# Patient Record
Sex: MALE | Race: WHITE | NOT HISPANIC OR LATINO | Employment: OTHER | URBAN - METROPOLITAN AREA
[De-identification: names, ages, dates, MRNs, and addresses within clinical notes are randomized per-mention and may not be internally consistent; named-entity substitution may affect disease eponyms.]

---

## 2023-03-06 ENCOUNTER — NURSING HOME VISIT (OUTPATIENT)
Dept: POST ACUTE CARE | Facility: EXTERNAL LOCATION | Age: 83
End: 2023-03-06
Payer: COMMERCIAL

## 2023-03-06 DIAGNOSIS — I10 ESSENTIAL (PRIMARY) HYPERTENSION: ICD-10-CM

## 2023-03-06 DIAGNOSIS — G20.A1 PARKINSON'S DISEASE (MULTI): ICD-10-CM

## 2023-03-06 DIAGNOSIS — R53.1 WEAKNESS: ICD-10-CM

## 2023-03-06 PROCEDURE — 99308 SBSQ NF CARE LOW MDM 20: CPT | Performed by: INTERNAL MEDICINE

## 2023-03-06 NOTE — LETTER
Subjective  Chief complaint: Jesus Lantigua is a 82 y.o. male who is a long term resident.  Patient presents for follow-up therapy.   HPI:  Patient presents for follow-up therapy.  Patient examined today at bedside.  Patient denies any pain or discomfort.  Patient continues working in therapy.  Therapy report patient is currently working on dynamic balance activities while standing at rollator.  Patient requires standby assistance.  Patient able to tolerate up to 4 minutes.  Staff reports no new issues.  No acute distress.        Review of Systems  All systems reviewed and negative except for what was mentioned in the HPI    Vital signs: 119/70, 98.0, 78, 17    Objective  Physical Exam  Constitutional:       General: He is not in acute distress.  Eyes:      Extraocular Movements: Extraocular movements intact.   Cardiovascular:      Rate and Rhythm: Regular rhythm.   Pulmonary:      Effort: Pulmonary effort is normal.      Breath sounds: Normal breath sounds.   Abdominal:      General: Bowel sounds are normal.      Palpations: Abdomen is soft.   Musculoskeletal:      Cervical back: Neck supple.      Right lower leg: No edema.      Left lower leg: No edema.   Neurological:      Mental Status: He is alert.   Psychiatric:         Mood and Affect: Mood normal.         Behavior: Behavior is cooperative.         Assessment/Plan  Problem List Items Addressed This Visit       Essential (primary) hypertension     Blood pressures controlled.  Continue metoprolol.  Monitor blood pressures.         Parkinson's disease (CMS/[facility])     Tremors controlled.  Continue Sinemet.         Weakness     Gait unsteady.  Continue working in PT OT.          Medications, treatments, and labs reviewed  Continue medications and treatments as listed in [facility]    Scribe Attestation  By signing my name below, Idomitila Scribe   attest that this documentation has been prepared under the direction and in the presence of Domitila Coleman  LPN.    Provider Attestation - Scribe documentation  All medical record entries made by the Scribe were at my direction and personally dictated by me. I have reviewed the chart and agree that the record accurately reflects my personal performance of the history, physical exam, discussion and plan.

## 2023-03-07 PROBLEM — I10 ESSENTIAL (PRIMARY) HYPERTENSION: Status: ACTIVE | Noted: 2023-03-07

## 2023-03-07 PROBLEM — R41.841 COGNITIVE COMMUNICATION DEFICIT: Status: ACTIVE | Noted: 2023-03-07

## 2023-03-07 PROBLEM — N18.1 CHRONIC KIDNEY DISEASE, STAGE 1: Status: ACTIVE | Noted: 2023-03-07

## 2023-03-07 PROBLEM — G20.A1 PARKINSON'S DISEASE (MULTI): Status: ACTIVE | Noted: 2023-03-07

## 2023-03-07 PROBLEM — E11.9 TYPE 2 DIABETES MELLITUS WITHOUT COMPLICATION (MULTI): Status: ACTIVE | Noted: 2023-03-07

## 2023-03-07 PROBLEM — R53.1 WEAKNESS: Status: ACTIVE | Noted: 2023-03-07

## 2023-03-07 PROBLEM — I25.10 ATHEROSCLEROTIC HEART DISEASE OF NATIVE CORONARY ARTERY WITHOUT ANGINA PECTORIS: Status: ACTIVE | Noted: 2023-03-07

## 2023-03-07 NOTE — PROGRESS NOTES
Subjective   Chief complaint: Jesus Lantigua is a 82 y.o. male who is a long term resident.  Patient presents for follow-up therapy.   HPI:  Patient presents for follow-up therapy.  Patient examined today at bedside.  Patient denies any pain or discomfort.  Patient continues working in therapy.  Therapy report patient is currently working on dynamic balance activities while standing at rollator.  Patient requires standby assistance.  Patient able to tolerate up to 4 minutes.  Staff reports no new issues.  No acute distress.        Review of Systems  All systems reviewed and negative except for what was mentioned in the HPI    Vital signs: 119/70, 98.0, 78, 17    Objective   Physical Exam  Constitutional:       General: He is not in acute distress.  Eyes:      Extraocular Movements: Extraocular movements intact.   Cardiovascular:      Rate and Rhythm: Regular rhythm.   Pulmonary:      Effort: Pulmonary effort is normal.      Breath sounds: Normal breath sounds.   Abdominal:      General: Bowel sounds are normal.      Palpations: Abdomen is soft.   Musculoskeletal:      Cervical back: Neck supple.      Right lower leg: No edema.      Left lower leg: No edema.   Neurological:      Mental Status: He is alert.   Psychiatric:         Mood and Affect: Mood normal.         Behavior: Behavior is cooperative.         Assessment/Plan   Problem List Items Addressed This Visit       Essential (primary) hypertension     Blood pressures controlled.  Continue metoprolol.  Monitor blood pressures.         Parkinson's disease (CMS/[facility])     Tremors controlled.  Continue Sinemet.         Weakness     Gait unsteady.  Continue working in PT OT.          Medications, treatments, and labs reviewed  Continue medications and treatments as listed in [facility]    Scribe Attestation  By signing my name below, Idomitila Scribe   attest that this documentation has been prepared under the direction and in the presence of Domitila Coleman  LPN.    Provider Attestation - Scribe documentation  All medical record entries made by the Scribe were at my direction and personally dictated by me. I have reviewed the chart and agree that the record accurately reflects my personal performance of the history, physical exam, discussion and plan.

## 2023-03-17 NOTE — PROGRESS NOTES
Subjective   Chief complaint: Jesus Lantigua is a 82 y.o. male who is a long term resident.  Patient presents for follow-up therapy.   HPI:  Patient presents for follow-up therapy.  Patient examined today at bedside.  Patient denies any pain or discomfort.  Patient continues working in therapy.  Therapy report patient is currently working on dynamic balance activities while standing at rollator.  Patient requires standby assistance.  Patient able to tolerate up to 4 minutes.  Staff reports no new issues.  No acute distress.        Review of Systems  All systems reviewed and negative except for what was mentioned in the HPI    Vital signs: 119/70, 98.0, 78, 17    Objective   Physical Exam  Constitutional:       General: He is not in acute distress.  Eyes:      Extraocular Movements: Extraocular movements intact.   Cardiovascular:      Rate and Rhythm: Regular rhythm.   Pulmonary:      Effort: Pulmonary effort is normal.      Breath sounds: Normal breath sounds.   Abdominal:      General: Bowel sounds are normal.      Palpations: Abdomen is soft.   Musculoskeletal:      Cervical back: Neck supple.      Right lower leg: No edema.      Left lower leg: No edema.   Neurological:      Mental Status: He is alert.   Psychiatric:         Mood and Affect: Mood normal.         Behavior: Behavior is cooperative.         Assessment/Plan   Problem List Items Addressed This Visit          Nervous    Parkinson's disease (CMS/HCC)     Tremors controlled.  Continue Sinemet.            Circulatory    Essential (primary) hypertension     Blood pressures controlled.  Continue metoprolol.  Monitor blood pressures.            Other    Weakness     Gait unsteady.  Continue working in PT OT.          Medications, treatments, and labs reviewed  Continue medications and treatments as listed in [facility]    Scribe Attestation  By signing my name below, leann DEGROOT Scribe   attest that this documentation has been prepared under the direction  and in the presence of Domitila Coleman LPN.    Provider Attestation - Scribe documentation  All medical record entries made by the Scribe were at my direction and personally dictated by me. I have reviewed the chart and agree that the record accurately reflects my personal performance of the history, physical exam, discussion and plan.

## 2023-04-05 ENCOUNTER — NURSING HOME VISIT (OUTPATIENT)
Dept: POST ACUTE CARE | Facility: EXTERNAL LOCATION | Age: 83
End: 2023-04-05
Payer: COMMERCIAL

## 2023-04-05 DIAGNOSIS — I10 ESSENTIAL (PRIMARY) HYPERTENSION: ICD-10-CM

## 2023-04-05 DIAGNOSIS — G20.A1 PARKINSON'S DISEASE (MULTI): ICD-10-CM

## 2023-04-05 DIAGNOSIS — Z79.4 TYPE 2 DIABETES MELLITUS WITHOUT COMPLICATION, WITH LONG-TERM CURRENT USE OF INSULIN (MULTI): ICD-10-CM

## 2023-04-05 DIAGNOSIS — E11.9 TYPE 2 DIABETES MELLITUS WITHOUT COMPLICATION, WITH LONG-TERM CURRENT USE OF INSULIN (MULTI): ICD-10-CM

## 2023-04-05 PROCEDURE — 99309 SBSQ NF CARE MODERATE MDM 30: CPT | Performed by: INTERNAL MEDICINE

## 2023-04-05 NOTE — LETTER
Patient: Jesus Lantigua  : 1940    Encounter Date: 2023    PROGRESS NOTE    Subjective  Chief complaint: Jesus Lantigua is a 82 y.o. male who is a long term care patient being seen and evaluated for monthly general medical care and follow-up.    HPI:    Patient presents for general medical care and f/u.  Patient seen and examined at bedside.  No issues per nursing.  Patient has no acute complaints.   Patient denies vision changes, excessive thirst, sweating, urinary frequency. Patient with Dx of Parkinson.  Denies increased tremor, stiffness, or changes in functional ability.  HTN BP at goal.  Denies chest pain and headache.  No acute distress.       Objective  Vital signs: 124/72, 18, 98%    Physical Exam  Constitutional:       General: He is not in acute distress.  Eyes:      Extraocular Movements: Extraocular movements intact.   Cardiovascular:      Rate and Rhythm: Regular rhythm.   Pulmonary:      Effort: Pulmonary effort is normal.      Breath sounds: Normal breath sounds.   Abdominal:      General: Bowel sounds are normal.      Palpations: Abdomen is soft.   Musculoskeletal:      Cervical back: Neck supple.      Right lower leg: No edema.      Left lower leg: No edema.   Neurological:      Mental Status: He is alert.   Psychiatric:         Mood and Affect: Mood normal.         Behavior: Behavior is cooperative.         Assessment/Plan  Problem List Items Addressed This Visit          Nervous    Parkinson's disease (CMS/[facility])     Tremors controlled.  Continue Sinemet.            Circulatory    Essential (primary) hypertension     Blood pressures controlled.  Continue metoprolol.  Monitor blood pressures.            Endocrine/Metabolic    Type 2 diabetes mellitus without complication (CMS/HCC)     Monitor BS  Continue current meds          Medications, treatments, and labs reviewed  Continue medications and treatments as listed in [facility]    Scribe Attestation  By signing my name below, IJannette  Fiona Luther   attest that this documentation has been prepared under the direction and in the presence of Steven Muir MD.    Provider Attestation - Scribe documentation  All medical record entries made by the Scribe were at my direction and personally dictated by me. I have reviewed the chart and agree that the record accurately reflects my personal performance of the history, physical exam, discussion and plan.      Electronically Signed By: Steven Muir MD   4/7/23 12:52 PM

## 2023-04-07 NOTE — PROGRESS NOTES
PROGRESS NOTE    Subjective   Chief complaint: Jesus Lantigua is a 82 y.o. male who is a long term care patient being seen and evaluated for monthly general medical care and follow-up.    HPI:    Patient presents for general medical care and f/u.  Patient seen and examined at bedside.  No issues per nursing.  Patient has no acute complaints.   Patient denies vision changes, excessive thirst, sweating, urinary frequency. Patient with Dx of Parkinson.  Denies increased tremor, stiffness, or changes in functional ability.  HTN BP at goal.  Denies chest pain and headache.  No acute distress.       Objective   Vital signs: 124/72, 18, 98%    Physical Exam  Constitutional:       General: He is not in acute distress.  Eyes:      Extraocular Movements: Extraocular movements intact.   Cardiovascular:      Rate and Rhythm: Regular rhythm.   Pulmonary:      Effort: Pulmonary effort is normal.      Breath sounds: Normal breath sounds.   Abdominal:      General: Bowel sounds are normal.      Palpations: Abdomen is soft.   Musculoskeletal:      Cervical back: Neck supple.      Right lower leg: No edema.      Left lower leg: No edema.   Neurological:      Mental Status: He is alert.   Psychiatric:         Mood and Affect: Mood normal.         Behavior: Behavior is cooperative.         Assessment/Plan   Problem List Items Addressed This Visit          Nervous    Parkinson's disease (CMS/[facility])     Tremors controlled.  Continue Sinemet.            Circulatory    Essential (primary) hypertension     Blood pressures controlled.  Continue metoprolol.  Monitor blood pressures.            Endocrine/Metabolic    Type 2 diabetes mellitus without complication (CMS/HCC)     Monitor BS  Continue current meds          Medications, treatments, and labs reviewed  Continue medications and treatments as listed in [facility]    Scribe Attestation  By signing my name below, IJannette, Scribe   attest that this documentation has been prepared under the  direction and in the presence of Steven Muir MD.    Provider Attestation - Scribe documentation  All medical record entries made by the Scribe were at my direction and personally dictated by me. I have reviewed the chart and agree that the record accurately reflects my personal performance of the history, physical exam, discussion and plan.

## 2023-04-17 ENCOUNTER — NURSING HOME VISIT (OUTPATIENT)
Dept: POST ACUTE CARE | Facility: EXTERNAL LOCATION | Age: 83
End: 2023-04-17
Payer: COMMERCIAL
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
## 2023-04-17 PROCEDURE — 99308 SBSQ NF CARE LOW MDM 20: CPT | Performed by: INTERNAL MEDICINE

## 2023-04-17 NOTE — LETTER
Patient: Jesus Lantigua  : 1940    Encounter Date: 2023    PROGRESS NOTE    Subjective  Chief complaint: Jesus Lantigua is a 82 y.o. male who is a long term care patient being seen and evaluated for fall    HPI:    nurse reported that patient had an   Unwitnessed fall.  Patient denies pain and injury from the fall.   patient sent out to emergency room for evaluation.  X-rays and scans negative. No other complaints.      Objective  Vital signs: 141/63, 98%    Physical Exam  Constitutional:       General: He is not in acute distress.  Eyes:      Extraocular Movements: Extraocular movements intact.   Cardiovascular:      Rate and Rhythm: Normal rate and regular rhythm.   Pulmonary:      Effort: Pulmonary effort is normal.      Breath sounds: Normal breath sounds.   Abdominal:      General: Bowel sounds are normal.      Palpations: Abdomen is soft.   Musculoskeletal:         General: Normal range of motion.      Cervical back: Normal range of motion and neck supple.      Right lower leg: No edema.      Left lower leg: No edema.   Neurological:      Mental Status: He is alert.   Psychiatric:         Mood and Affect: Mood normal.         Behavior: Behavior is cooperative.         Assessment/Plan  Problem List Items Addressed This Visit          Other    Fall       No apparent injury          Medications, treatments, and labs reviewed  Continue medications and treatments as listed in [facility]    Scribe Attestation  By signing my name below, I, Fiona Wilder   attest that this documentation has been prepared under the direction and in the presence of Steven Muir MD.    Provider Attestation - Scribe documentation  All medical record entries made by the Scribe were at my direction and personally dictated by me. I have reviewed the chart and agree that the record accurately reflects my personal performance of the history, physical exam, discussion and plan.    1. Fall, initial encounter                Electronically Signed By: Steven Muir MD   4/20/23  5:54 PM
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
## 2023-04-20 NOTE — PROGRESS NOTES
PROGRESS NOTE    Subjective   Chief complaint: Jesus Lantigua is a 82 y.o. male who is a long term care patient being seen and evaluated for fall    HPI:    nurse reported that patient had an   Unwitnessed fall.  Patient denies pain and injury from the fall.   patient sent out to emergency room for evaluation.  X-rays and scans negative. No other complaints.      Objective   Vital signs: 141/63, 98%    Physical Exam  Constitutional:       General: He is not in acute distress.  Eyes:      Extraocular Movements: Extraocular movements intact.   Cardiovascular:      Rate and Rhythm: Normal rate and regular rhythm.   Pulmonary:      Effort: Pulmonary effort is normal.      Breath sounds: Normal breath sounds.   Abdominal:      General: Bowel sounds are normal.      Palpations: Abdomen is soft.   Musculoskeletal:         General: Normal range of motion.      Cervical back: Normal range of motion and neck supple.      Right lower leg: No edema.      Left lower leg: No edema.   Neurological:      Mental Status: He is alert.   Psychiatric:         Mood and Affect: Mood normal.         Behavior: Behavior is cooperative.         Assessment/Plan   Problem List Items Addressed This Visit          Other    Fall       No apparent injury          Medications, treatments, and labs reviewed  Continue medications and treatments as listed in [facility]    Scribe Attestation  By signing my name below, IJannette Scribe   attest that this documentation has been prepared under the direction and in the presence of Steven Muir MD.    Provider Attestation - Scribe documentation  All medical record entries made by the Scribe were at my direction and personally dictated by me. I have reviewed the chart and agree that the record accurately reflects my personal performance of the history, physical exam, discussion and plan.    1. Fall, initial encounter

## 2023-05-03 ENCOUNTER — NURSING HOME VISIT (OUTPATIENT)
Dept: POST ACUTE CARE | Facility: EXTERNAL LOCATION | Age: 83
End: 2023-05-03
Payer: COMMERCIAL

## 2023-05-03 DIAGNOSIS — I10 ESSENTIAL (PRIMARY) HYPERTENSION: ICD-10-CM

## 2023-05-03 DIAGNOSIS — G20.A1 PARKINSON'S DISEASE (MULTI): ICD-10-CM

## 2023-05-03 DIAGNOSIS — E11.9 TYPE 2 DIABETES MELLITUS WITHOUT COMPLICATION, WITH LONG-TERM CURRENT USE OF INSULIN (MULTI): ICD-10-CM

## 2023-05-03 DIAGNOSIS — Z79.4 TYPE 2 DIABETES MELLITUS WITHOUT COMPLICATION, WITH LONG-TERM CURRENT USE OF INSULIN (MULTI): ICD-10-CM

## 2023-05-03 PROCEDURE — 99309 SBSQ NF CARE MODERATE MDM 30: CPT | Performed by: INTERNAL MEDICINE

## 2023-05-03 NOTE — LETTER
Patient: Jesus Lantigua  : 1940    Encounter Date: 2023    PROGRESS NOTE    Subjective  Chief complaint: Jesus Lantigua is a 83 y.o. male who is a long term care patient being seen and evaluated for monthly general medical care and follow-up.    HPI:    Patient presents for general medical care and f/u.  Patient seen and examined at bedside.  No issues per nursing.  Patient has no acute complaints.   Patient denies vision changes, excessive thirst, sweating, urinary frequency. Patient with Dx of Parkinson.  Denies increased tremor, stiffness, or changes in functional ability.  HTN BP at goal.  Denies chest pain and headache.  No acute distress.       Objective  Vital signs: 126/70, 98%    Physical Exam  Constitutional:       General: He is not in acute distress.  Eyes:      Extraocular Movements: Extraocular movements intact.   Cardiovascular:      Rate and Rhythm: Regular rhythm.   Pulmonary:      Effort: Pulmonary effort is normal.      Breath sounds: Normal breath sounds.   Abdominal:      General: Bowel sounds are normal.      Palpations: Abdomen is soft.   Musculoskeletal:      Cervical back: Neck supple.      Right lower leg: No edema.      Left lower leg: No edema.   Neurological:      Mental Status: He is alert.   Psychiatric:         Mood and Affect: Mood normal.         Behavior: Behavior is cooperative.         Assessment/Plan  Problem List Items Addressed This Visit          Nervous    Parkinson's disease (CMS/[facility])     Tremors controlled.  Continue Sinemet.            Circulatory    Essential (primary) hypertension     Blood pressures controlled.  Continue metoprolol.  Monitor blood pressures.            Endocrine/Metabolic    Type 2 diabetes mellitus without complication (CMS/HCC)     Monitor BS  Continue current meds        Medications, treatments, and labs reviewed  Continue medications and treatments as listed in [facility]    Scribe Attestation  By signing my name below, I, Jannette Luther,  Scribe   attest that this documentation has been prepared under the direction and in the presence of Steven Muir MD.    Provider Attestation - Scribe documentation  All medical record entries made by the Scribe were at my direction and personally dictated by me. I have reviewed the chart and agree that the record accurately reflects my personal performance of the history, physical exam, discussion and plan.  1. Essential (primary) hypertension        2. Parkinson's disease (CMS/[facility])        3. Type 2 diabetes mellitus without complication, with long-term current use of insulin (CMS/[facility])              Electronically Signed By: Steven Muir MD   5/4/23  5:42 PM

## 2023-05-04 NOTE — PROGRESS NOTES
PROGRESS NOTE    Subjective   Chief complaint: Jesus Lantigua is a 83 y.o. male who is a long term care patient being seen and evaluated for monthly general medical care and follow-up.    HPI:    Patient presents for general medical care and f/u.  Patient seen and examined at bedside.  No issues per nursing.  Patient has no acute complaints.   Patient denies vision changes, excessive thirst, sweating, urinary frequency. Patient with Dx of Parkinson.  Denies increased tremor, stiffness, or changes in functional ability.  HTN BP at goal.  Denies chest pain and headache.  No acute distress.       Objective   Vital signs: 126/70, 98%    Physical Exam  Constitutional:       General: He is not in acute distress.  Eyes:      Extraocular Movements: Extraocular movements intact.   Cardiovascular:      Rate and Rhythm: Regular rhythm.   Pulmonary:      Effort: Pulmonary effort is normal.      Breath sounds: Normal breath sounds.   Abdominal:      General: Bowel sounds are normal.      Palpations: Abdomen is soft.   Musculoskeletal:      Cervical back: Neck supple.      Right lower leg: No edema.      Left lower leg: No edema.   Neurological:      Mental Status: He is alert.   Psychiatric:         Mood and Affect: Mood normal.         Behavior: Behavior is cooperative.         Assessment/Plan   Problem List Items Addressed This Visit          Nervous    Parkinson's disease (CMS/HCC)     Tremors controlled.  Continue Sinemet.            Circulatory    Essential (primary) hypertension     Blood pressures controlled.  Continue metoprolol.  Monitor blood pressures.            Endocrine/Metabolic    Type 2 diabetes mellitus without complication (CMS/HCC)     Monitor BS  Continue current meds        Medications, treatments, and labs reviewed  Continue medications and treatments as listed in [facility]    Scribe Attestation  By signing my name below, IJannette, Scribe   attest that this documentation has been prepared under the  direction and in the presence of Steven Muir MD.    Provider Attestation - Scribe documentation  All medical record entries made by the Scribe were at my direction and personally dictated by me. I have reviewed the chart and agree that the record accurately reflects my personal performance of the history, physical exam, discussion and plan.  1. Essential (primary) hypertension        2. Parkinson's disease (CMS/[facility])        3. Type 2 diabetes mellitus without complication, with long-term current use of insulin (CMS/[facility])

## 2023-05-10 ENCOUNTER — NURSING HOME VISIT (OUTPATIENT)
Dept: POST ACUTE CARE | Facility: EXTERNAL LOCATION | Age: 83
End: 2023-05-10
Payer: COMMERCIAL

## 2023-05-10 DIAGNOSIS — I25.10 ATHEROSCLEROSIS OF NATIVE CORONARY ARTERY OF NATIVE HEART WITHOUT ANGINA PECTORIS: Primary | ICD-10-CM

## 2023-05-10 DIAGNOSIS — R41.841 COGNITIVE COMMUNICATION DEFICIT: ICD-10-CM
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
## 2023-05-10 DIAGNOSIS — Z79.4 TYPE 2 DIABETES MELLITUS WITHOUT COMPLICATION, WITH LONG-TERM CURRENT USE OF INSULIN (MULTI): ICD-10-CM

## 2023-05-10 DIAGNOSIS — G20.A1 PARKINSON'S DISEASE (MULTI): ICD-10-CM

## 2023-05-10 DIAGNOSIS — N18.1 CHRONIC KIDNEY DISEASE, STAGE 1: ICD-10-CM

## 2023-05-10 DIAGNOSIS — E11.9 TYPE 2 DIABETES MELLITUS WITHOUT COMPLICATION, WITH LONG-TERM CURRENT USE OF INSULIN (MULTI): ICD-10-CM

## 2023-05-10 DIAGNOSIS — R53.1 WEAKNESS: ICD-10-CM

## 2023-05-10 DIAGNOSIS — I10 ESSENTIAL (PRIMARY) HYPERTENSION: ICD-10-CM

## 2023-05-10 PROCEDURE — 99305 1ST NF CARE MODERATE MDM 35: CPT | Performed by: INTERNAL MEDICINE

## 2023-05-10 NOTE — LETTER
Patient: Jesus Lantigua  : 1940    Encounter Date: 05/10/2023    HISTORY & PHYSICAL    Subjective  Chief complaint: Jesus Lantigua is a 83 y.o. male who is a acute skilled care patient being seen and evaluated for multiple medical problems.  Patient presents for weakness    HPI:  lu Lantigua is a 83 year old male who presents   1. Parkinsonism, unspecified Parkinsonism type (HCC)  Stable with symptoms improved on Sinemet. Currently doing well per daughter since becoming more active.  - Continue carbidopa-levodopa (Sinemet)  MG tablet TID   - Encouraged active lifestyle  2. Seizure disorder (HCC)  Stable for many years, no indication to change treatment regimen.  - Continue phenobarbital at current dose   Patient admitted to the nursing home for physical therapy PT and OT before discharge home.        Past Medical History:   Diagnosis Date   • Atherosclerotic heart disease of native coronary artery without angina pectoris    • Chronic kidney disease, stage 1    • Cognitive communication deficit    • Essential (primary) hypertension    • Parkinson's disease (CMS/HCC)    • Type 2 diabetes mellitus without complication (CMS/HCC)        Past Surgical History:   Procedure Laterality Date   • APPENDECTOMY  2016    Appendectomy   • OTHER SURGICAL HISTORY  2016    Cath Stent Placement Number Of Stents Placed:       No family history on file.    Social History     Socioeconomic History   • Marital status:      Spouse name: Not on file   • Number of children: Not on file   • Years of education: Not on file   • Highest education level: Not on file   Occupational History   • Not on file   Tobacco Use   • Smoking status: Not on file   • Smokeless tobacco: Not on file   Substance and Sexual Activity   • Alcohol use: Not on file   • Drug use: Not on file   • Sexual activity: Not on file   Other Topics Concern   • Not on file   Social History Narrative   • Not on file     Social Determinants of  Health     Financial Resource Strain: Not on file   Food Insecurity: Not on file   Transportation Needs: Not on file   Physical Activity: Not on file   Stress: Not on file   Social Connections: Not on file   Intimate Partner Violence: Not on file   Housing Stability: Not on file       Vital signs:       Objective  Physical Exam  Vitals reviewed.   Constitutional:       Appearance: Normal appearance.   HENT:      Head: Normocephalic and atraumatic.   Cardiovascular:      Rate and Rhythm: Normal rate and regular rhythm.   Pulmonary:      Effort: Pulmonary effort is normal.      Breath sounds: Normal breath sounds.   Abdominal:      General: Bowel sounds are normal.      Palpations: Abdomen is soft.   Musculoskeletal:      Cervical back: Neck supple.   Skin:     General: Skin is warm and dry.   Neurological:      General: No focal deficit present.      Mental Status: He is alert.   Psychiatric:         Mood and Affect: Mood normal.         Behavior: Behavior is cooperative.         Assessment/Plan  Problem List Items Addressed This Visit          Cardiac and Vasculature    Atherosclerotic heart disease of native coronary artery without angina pectoris - Primary    Essential (primary) hypertension       Endocrine/Metabolic    Type 2 diabetes mellitus without complication (CMS/HCC)       Genitourinary and Reproductive    Chronic kidney disease, stage 1       Musculoskeletal and Injuries    Fall       Neuro    Cognitive communication deficit    Parkinson's disease (CMS/HCC)       Symptoms and Signs    Weakness     Hospital records reviewed  Medications, treatments, and labs reviewed  Continue medications and treatments as listed in EMR  Discussed with nursing and therapy    Steven Muir MD      Electronically Signed By: Steven Muir MD   6/28/23  5:02 PM

## 2023-06-28 NOTE — PROGRESS NOTES
HISTORY & PHYSICAL    Subjective   Chief complaint: Jesus Lantigua is a 83 y.o. male who is a acute skilled care patient being seen and evaluated for multiple medical problems.  Patient presents for weakness    HPI:  lu Lantigua is a 83 year old male who presents   1. Parkinsonism, unspecified Parkinsonism type (HCC)  Stable with symptoms improved on Sinemet. Currently doing well per daughter since becoming more active.  - Continue carbidopa-levodopa (Sinemet)  MG tablet TID   - Encouraged active lifestyle  2. Seizure disorder (HCC)  Stable for many years, no indication to change treatment regimen.  - Continue phenobarbital at current dose   Patient admitted to the nursing home for physical therapy PT and OT before discharge home.        Past Medical History:   Diagnosis Date    Atherosclerotic heart disease of native coronary artery without angina pectoris     Chronic kidney disease, stage 1     Cognitive communication deficit     Essential (primary) hypertension     Parkinson's disease (CMS/HCC)     Type 2 diabetes mellitus without complication (CMS/HCC)        Past Surgical History:   Procedure Laterality Date    APPENDECTOMY  11/14/2016    Appendectomy    OTHER SURGICAL HISTORY  11/14/2016    Cath Stent Placement Number Of Stents Placed:       No family history on file.    Social History     Socioeconomic History    Marital status:      Spouse name: Not on file    Number of children: Not on file    Years of education: Not on file    Highest education level: Not on file   Occupational History    Not on file   Tobacco Use    Smoking status: Not on file    Smokeless tobacco: Not on file   Substance and Sexual Activity    Alcohol use: Not on file    Drug use: Not on file    Sexual activity: Not on file   Other Topics Concern    Not on file   Social History Narrative    Not on file     Social Determinants of Health     Financial Resource Strain: Not on file   Food Insecurity: Not on file    Transportation Needs: Not on file   Physical Activity: Not on file   Stress: Not on file   Social Connections: Not on file   Intimate Partner Violence: Not on file   Housing Stability: Not on file       Vital signs:       Objective   Physical Exam  Vitals reviewed.   Constitutional:       Appearance: Normal appearance.   HENT:      Head: Normocephalic and atraumatic.   Cardiovascular:      Rate and Rhythm: Normal rate and regular rhythm.   Pulmonary:      Effort: Pulmonary effort is normal.      Breath sounds: Normal breath sounds.   Abdominal:      General: Bowel sounds are normal.      Palpations: Abdomen is soft.   Musculoskeletal:      Cervical back: Neck supple.   Skin:     General: Skin is warm and dry.   Neurological:      General: No focal deficit present.      Mental Status: He is alert.   Psychiatric:         Mood and Affect: Mood normal.         Behavior: Behavior is cooperative.         Assessment/Plan   Problem List Items Addressed This Visit          Cardiac and Vasculature    Atherosclerotic heart disease of native coronary artery without angina pectoris - Primary    Essential (primary) hypertension       Endocrine/Metabolic    Type 2 diabetes mellitus without complication (CMS/HCC)       Genitourinary and Reproductive    Chronic kidney disease, stage 1       Musculoskeletal and Injuries    Fall       Neuro    Cognitive communication deficit    Parkinson's disease (CMS/HCC)       Symptoms and Signs    Weakness     Hospital records reviewed  Medications, treatments, and labs reviewed  Continue medications and treatments as listed in EMR  Discussed with nursing and therapy    Steven Muir MD

## 2024-04-01 ENCOUNTER — NURSING HOME VISIT (OUTPATIENT)
Dept: POST ACUTE CARE | Facility: EXTERNAL LOCATION | Age: 84
End: 2024-04-01
Payer: COMMERCIAL

## 2024-04-01 DIAGNOSIS — Z79.4 TYPE 2 DIABETES MELLITUS WITHOUT COMPLICATION, WITH LONG-TERM CURRENT USE OF INSULIN (MULTI): ICD-10-CM

## 2024-04-01 DIAGNOSIS — E11.9 TYPE 2 DIABETES MELLITUS WITHOUT COMPLICATION, WITH LONG-TERM CURRENT USE OF INSULIN (MULTI): ICD-10-CM

## 2024-04-01 DIAGNOSIS — G20.A1 PARKINSON'S DISEASE, UNSPECIFIED WHETHER DYSKINESIA PRESENT, UNSPECIFIED WHETHER MANIFESTATIONS FLUCTUATE (MULTI): ICD-10-CM

## 2024-04-01 DIAGNOSIS — N39.0 URINARY TRACT INFECTION WITHOUT HEMATURIA, SITE UNSPECIFIED: Primary | ICD-10-CM

## 2024-04-01 DIAGNOSIS — I10 ESSENTIAL (PRIMARY) HYPERTENSION: ICD-10-CM

## 2024-04-01 DIAGNOSIS — R31.9 HEMATURIA, UNSPECIFIED TYPE: ICD-10-CM

## 2024-04-01 DIAGNOSIS — G40.909 SEIZURE DISORDER (MULTI): ICD-10-CM

## 2024-04-01 PROCEDURE — 99305 1ST NF CARE MODERATE MDM 35: CPT | Performed by: INTERNAL MEDICINE

## 2024-04-01 NOTE — LETTER
Patient: Jesus Lantigua  : 1940    Encounter Date: 2024    HISTORY & PHYSICAL    Subjective  Chief complaint: Jesus Lantigua is a 83 y.o. male who is being seen and evaluated for multiple medical problems.  Patient admitted to SNF for therapy due to weakness after recent hospitalization.    HPI:  HPI  Patient was admitted to the hospital for UTI.  Patient was followed by urology.  Patient underwent cystoscopy, bilateral retrograde pyelogram on 3/25/2024.  Patient is to follow-up outpatient with urology and is to continue with Ashley catheter.  Patient had a concern for acute convulsion in hospital and was followed by neuro, patient does have a history of Parkinson's disease.  Neurology recommended continued medications as currently ordered.  Patient was hemodynamically stable to discharge to SNF for continued medical management and therapy.  Patient was seen and examined at bedside, appears to be in no acute distress.  Afebrile.  Patient is tolerating antibiotic for UTI without adverse effects.  Past Medical History:   Diagnosis Date   • Atherosclerotic heart disease of native coronary artery without angina pectoris    • Chronic kidney disease, stage 1    • Cognitive communication deficit    • Dementia (CMS/[facility])    • Essential (primary) hypertension    • Hyperlipidemia    • Parkinson's disease (CMS/[facility])    • Type 2 diabetes mellitus without complication (CMS/[facility])    • Urinary tract infection        Past Surgical History:   Procedure Laterality Date   • APPENDECTOMY  2016    Appendectomy   • OTHER SURGICAL HISTORY  2016    Cath Stent Placement Number Of Stents Placed:       Family History   Problem Relation Name Age of Onset   • No Known Problems Mother     • No Known Problems Father         Social History     Socioeconomic History   • Marital status:      Spouse name: Not on file   • Number of children: Not on file   • Years of education: Not on file   • Highest education level: Not  on file   Occupational History   • Not on file   Tobacco Use   • Smoking status: Not on file   • Smokeless tobacco: Not on file   Substance and Sexual Activity   • Alcohol use: Not on file   • Drug use: Not on file   • Sexual activity: Not on file   Other Topics Concern   • Not on file   Social History Narrative   • Not on file     Social Determinants of Health     Financial Resource Strain: Not on file   Food Insecurity: Not on file   Transportation Needs: Not on file   Physical Activity: Not on file   Stress: Not on file   Social Connections: Not on file   Intimate Partner Violence: Not on file   Housing Stability: Not on file       Vital signs: 120/63, 98.1, 9119 blood sugar 298, 96%    Objective  Physical Exam  Vitals reviewed.   Constitutional:       Appearance: Normal appearance.   HENT:      Head: Normocephalic and atraumatic.   Cardiovascular:      Rate and Rhythm: Normal rate and regular rhythm.   Pulmonary:      Effort: Pulmonary effort is normal.      Breath sounds: Normal breath sounds.   Abdominal:      General: Bowel sounds are normal.      Palpations: Abdomen is soft.   Musculoskeletal:      Cervical back: Neck supple.   Skin:     General: Skin is warm and dry.   Neurological:      General: No focal deficit present.      Mental Status: He is alert.   Psychiatric:         Mood and Affect: Mood normal.         Behavior: Behavior is cooperative.         Assessment/Plan  Problem List Items Addressed This Visit       Essential (primary) hypertension     Monitor blood pressure  Metoprolol         Parkinson's disease (CMS/[facility])     Carbidopa levodopa  Monitor         Type 2 diabetes mellitus without complication (CMS/[facility])     Glucoscans with sliding scale insulin  Insulin  Consistent carb/liberal diabetic diet         UTI (urinary tract infection) - Primary     Continue cephalexin until complete  Follow-up with urology outpatient         Seizure disorder (CMS/[facility])     Phenobarbital  Monitor for seizure  activity         Hematuria     Follow-up with urology outpatient  Ashley  Status post cystoscopy, bilateral retrograde pyelogram          Hospital records reviewed  Medications, treatments, and labs reviewed  Continue medications and treatments as listed in EMR  Discussed with nursing and therapy      Scribe Attestation  Rosaline DEGROOT Scribe   attest that this documentation has been prepared under the direction and in the presence of Steven Muir MD    Provider Attestation - Scribe documentation  All medical record entries made by the Scribe were at my direction and personally dictated by me. I have reviewed the chart and agree that the record accurately reflects my personal performance of the history, physical exam, discussion and plan.   Steven Muir MD      Electronically Signed By: Steven Muir MD   4/1/24  3:07 PM

## 2024-04-02 ENCOUNTER — NURSING HOME VISIT (OUTPATIENT)
Dept: POST ACUTE CARE | Facility: EXTERNAL LOCATION | Age: 84
End: 2024-04-02
Payer: COMMERCIAL

## 2024-04-02 DIAGNOSIS — G20.A1 PARKINSON'S DISEASE, UNSPECIFIED WHETHER DYSKINESIA PRESENT, UNSPECIFIED WHETHER MANIFESTATIONS FLUCTUATE (MULTI): ICD-10-CM

## 2024-04-02 DIAGNOSIS — G40.909 SEIZURE DISORDER (MULTI): ICD-10-CM

## 2024-04-02 DIAGNOSIS — E11.9 TYPE 2 DIABETES MELLITUS WITHOUT COMPLICATION, WITH LONG-TERM CURRENT USE OF INSULIN (MULTI): ICD-10-CM

## 2024-04-02 DIAGNOSIS — R31.9 HEMATURIA, UNSPECIFIED TYPE: ICD-10-CM

## 2024-04-02 DIAGNOSIS — R53.1 WEAKNESS: Primary | ICD-10-CM

## 2024-04-02 DIAGNOSIS — Z79.4 TYPE 2 DIABETES MELLITUS WITHOUT COMPLICATION, WITH LONG-TERM CURRENT USE OF INSULIN (MULTI): ICD-10-CM

## 2024-04-02 DIAGNOSIS — N39.0 URINARY TRACT INFECTION WITHOUT HEMATURIA, SITE UNSPECIFIED: ICD-10-CM

## 2024-04-02 PROCEDURE — 99309 SBSQ NF CARE MODERATE MDM 30: CPT | Performed by: NURSE PRACTITIONER

## 2024-04-02 NOTE — LETTER
Patient: Jesus Lantigua  : 1940    Encounter Date: 2024    PROGRESS NOTE    Subjective  Chief complaint: Jesus Lantigua is a 83 y.o. male who is an acute skilled patient being seen and evaluated for weakness    HPI: is currently working with the therapist. Denies any chest pain or tightness.   Requires brina catheter.   HPI      Objective  Vital signs: 120/64-72-18-97.3     Physical Exam  Vitals and nursing note reviewed.   Constitutional:       General: He is not in acute distress.  Eyes:      Extraocular Movements: Extraocular movements intact.   Cardiovascular:      Rate and Rhythm: Normal rate and regular rhythm.   Pulmonary:      Effort: Pulmonary effort is normal.      Breath sounds: Normal breath sounds.      Comments: Lungs clear   Abdominal:      General: Bowel sounds are normal.      Palpations: Abdomen is soft.   Genitourinary:     Comments: Requires brian catheter   Musculoskeletal:      Cervical back: Neck supple.      Right lower leg: No edema.      Left lower leg: No edema.   Neurological:      Mental Status: He is alert.   Psychiatric:         Mood and Affect: Mood normal.         Behavior: Behavior is cooperative.         Assessment/Plan  Problem List Items Addressed This Visit       Parkinson's disease (CMS/[facility])     Carbidopa levodopa  Monitor         Type 2 diabetes mellitus without complication (CMS/[facility])     Glucoscans with sliding scale insulin  Insulin  Consistent carb/liberal diabetic diet         Weakness - Primary     Continue working in therapy towards goals         UTI (urinary tract infection)     Continue cephalexin until complete  Follow-up with urology outpatient  Requires brian catheter          Seizure disorder (CMS/[facility])     Phenobarbital  Monitor for seizure activity         Hematuria     Follow-up with urology outpatient  Brian  Status post cystoscopy, bilateral retrograde pyelogram          Medications, treatments, and labs reviewed  Continue medications and  treatments as listed in EMR    JACKIE Jones      Electronically Signed By: JACKIE Jones   4/4/24  6:16 PM   No

## 2024-04-03 ENCOUNTER — NURSING HOME VISIT (OUTPATIENT)
Dept: POST ACUTE CARE | Facility: EXTERNAL LOCATION | Age: 84
End: 2024-04-03
Payer: COMMERCIAL

## 2024-04-03 DIAGNOSIS — G40.909 SEIZURE DISORDER (MULTI): ICD-10-CM

## 2024-04-03 DIAGNOSIS — E11.9 TYPE 2 DIABETES MELLITUS WITHOUT COMPLICATION, WITH LONG-TERM CURRENT USE OF INSULIN (MULTI): ICD-10-CM

## 2024-04-03 DIAGNOSIS — Z79.4 TYPE 2 DIABETES MELLITUS WITHOUT COMPLICATION, WITH LONG-TERM CURRENT USE OF INSULIN (MULTI): ICD-10-CM

## 2024-04-03 DIAGNOSIS — I10 ESSENTIAL (PRIMARY) HYPERTENSION: ICD-10-CM

## 2024-04-03 DIAGNOSIS — G20.A1 PARKINSON'S DISEASE, UNSPECIFIED WHETHER DYSKINESIA PRESENT, UNSPECIFIED WHETHER MANIFESTATIONS FLUCTUATE (MULTI): ICD-10-CM

## 2024-04-03 DIAGNOSIS — R53.1 WEAKNESS: Primary | ICD-10-CM

## 2024-04-03 PROCEDURE — 99308 SBSQ NF CARE LOW MDM 20: CPT | Performed by: INTERNAL MEDICINE

## 2024-04-03 NOTE — LETTER
Patient: Jesus Lantigua  : 1940    Encounter Date: 2024    PROGRESS NOTE    Subjective  Chief complaint: Jesus Lantigua is a 83 y.o. male who is an acute skilled patient being seen and evaluated for weakness    HPI:  HPI  Therapy has evaluated patient and establish plan of care and goals.  Patient was seen and examined at bedside, appears to be in no acute distress.  Denies chest pain or shortness of breath.  Afebrile.  Nursing staff voicing no new concerns at this time.    Objective  Vital signs: 120/63, 98.1, 9119 blood sugar 183, 96%    Physical Exam  Vitals reviewed.   Constitutional:       Appearance: Normal appearance.   HENT:      Head: Normocephalic and atraumatic.   Cardiovascular:      Rate and Rhythm: Normal rate and regular rhythm.   Pulmonary:      Effort: Pulmonary effort is normal.      Breath sounds: Normal breath sounds.   Abdominal:      General: Bowel sounds are normal.      Palpations: Abdomen is soft.   Musculoskeletal:      Cervical back: Neck supple.   Skin:     General: Skin is warm and dry.   Neurological:      General: No focal deficit present.      Mental Status: He is alert.      Motor: Weakness present.   Psychiatric:         Mood and Affect: Mood normal.         Behavior: Behavior is cooperative.         Assessment/Plan  Problem List Items Addressed This Visit       Essential (primary) hypertension     Monitor blood pressure  Metoprolol         Parkinson's disease (CMS/[facility])     Carbidopa levodopa  Monitor         Type 2 diabetes mellitus without complication (CMS/[facility])     Glucoscans with sliding scale insulin  Insulin  Consistent carb/liberal diabetic diet         Weakness - Primary     Continue working in therapy towards goals         Seizure disorder (CMS/[facility])     Phenobarbital  Monitor for seizure activity          Medications, treatments, and labs reviewed  Continue medications and treatments as listed in EMR      Scribe AttestRosaline Dodd Scribe attest  that this documentation has been prepared under the direction and in the presence of Steven Muir MD    Provider Attestation - Scribe documentation  All medical record entries made by the Scribe were at my direction and personally dictated by me. I have reviewed the chart and agree that the record accurately reflects my personal performance of the history, physical exam, discussion and plan.   Steven Muir MD        Electronically Signed By: Steven Muir MD   4/4/24  2:12 PM

## 2024-04-04 ENCOUNTER — NURSING HOME VISIT (OUTPATIENT)
Dept: POST ACUTE CARE | Facility: EXTERNAL LOCATION | Age: 84
End: 2024-04-04
Payer: COMMERCIAL

## 2024-04-04 DIAGNOSIS — G40.909 SEIZURE DISORDER (MULTI): ICD-10-CM

## 2024-04-04 DIAGNOSIS — G20.A1 PARKINSON'S DISEASE, UNSPECIFIED WHETHER DYSKINESIA PRESENT, UNSPECIFIED WHETHER MANIFESTATIONS FLUCTUATE (MULTI): ICD-10-CM

## 2024-04-04 DIAGNOSIS — Z79.4 TYPE 2 DIABETES MELLITUS WITHOUT COMPLICATION, WITH LONG-TERM CURRENT USE OF INSULIN (MULTI): ICD-10-CM

## 2024-04-04 DIAGNOSIS — R53.1 WEAKNESS: Primary | ICD-10-CM

## 2024-04-04 DIAGNOSIS — E11.9 TYPE 2 DIABETES MELLITUS WITHOUT COMPLICATION, WITH LONG-TERM CURRENT USE OF INSULIN (MULTI): ICD-10-CM

## 2024-04-04 DIAGNOSIS — I10 ESSENTIAL (PRIMARY) HYPERTENSION: ICD-10-CM

## 2024-04-04 PROCEDURE — 99309 SBSQ NF CARE MODERATE MDM 30: CPT | Performed by: NURSE PRACTITIONER

## 2024-04-04 NOTE — PROGRESS NOTES
PROGRESS NOTE    Subjective   Chief complaint: Jesus Lantigua is a 83 y.o. male who is an acute skilled patient being seen and evaluated for weakness    HPI: Just finished breakfast, reports that he is waiting for therapy.   Denies any chest pain or tightness.   Reports that he slept well last evening.   HPI      Objective   Vital signs: 125/73-72-18-97.3     Physical Exam  Vitals and nursing note reviewed.   Constitutional:       General: He is not in acute distress.  Eyes:      Extraocular Movements: Extraocular movements intact.   Cardiovascular:      Rate and Rhythm: Normal rate and regular rhythm.   Pulmonary:      Effort: Pulmonary effort is normal.      Breath sounds: Normal breath sounds.      Comments: Lungs clear   Abdominal:      General: Bowel sounds are normal.      Palpations: Abdomen is soft.   Musculoskeletal:      Cervical back: Neck supple.      Right lower leg: No edema.      Left lower leg: No edema.   Neurological:      Mental Status: He is alert.   Psychiatric:         Mood and Affect: Mood normal.         Behavior: Behavior is cooperative.         Assessment/Plan   Problem List Items Addressed This Visit       Essential (primary) hypertension     Monitor blood pressure  Metoprolol         Parkinson's disease (CMS/[facility])     Carbidopa levodopa  Monitor         Type 2 diabetes mellitus without complication (CMS/[facility])     Glucoscans with sliding scale insulin  Insulin  Consistent carb/liberal diabetic diet  No evidence of any hypogylcemia          Weakness - Primary     Continue working in therapy towards goals         Seizure disorder (CMS/[facility])     Phenobarbital  Monitor for seizure activity          Medications, treatments, and labs reviewed  Continue medications and treatments as listed in EMR    Rola Enamorado, APRN-CNP

## 2024-04-04 NOTE — PROGRESS NOTES
PROGRESS NOTE    Subjective   Chief complaint: Jesus Lantigua is a 83 y.o. male who is an acute skilled patient being seen and evaluated for weakness    HPI: is currently working with the therapist. Denies any chest pain or tightness.   Requires brian catheter.   HPI      Objective   Vital signs: 120/64-72-18-97.3     Physical Exam  Vitals and nursing note reviewed.   Constitutional:       General: He is not in acute distress.  Eyes:      Extraocular Movements: Extraocular movements intact.   Cardiovascular:      Rate and Rhythm: Normal rate and regular rhythm.   Pulmonary:      Effort: Pulmonary effort is normal.      Breath sounds: Normal breath sounds.      Comments: Lungs clear   Abdominal:      General: Bowel sounds are normal.      Palpations: Abdomen is soft.   Genitourinary:     Comments: Requires brian catheter   Musculoskeletal:      Cervical back: Neck supple.      Right lower leg: No edema.      Left lower leg: No edema.   Neurological:      Mental Status: He is alert.   Psychiatric:         Mood and Affect: Mood normal.         Behavior: Behavior is cooperative.         Assessment/Plan   Problem List Items Addressed This Visit       Parkinson's disease (CMS/[facility])     Carbidopa levodopa  Monitor         Type 2 diabetes mellitus without complication (CMS/[facility])     Glucoscans with sliding scale insulin  Insulin  Consistent carb/liberal diabetic diet         Weakness - Primary     Continue working in therapy towards goals         UTI (urinary tract infection)     Continue cephalexin until complete  Follow-up with urology outpatient  Requires brian catheter          Seizure disorder (CMS/[facility])     Phenobarbital  Monitor for seizure activity         Hematuria     Follow-up with urology outpatient  Brian  Status post cystoscopy, bilateral retrograde pyelogram          Medications, treatments, and labs reviewed  Continue medications and treatments as listed in EMR    Rola Enamorado, APRN-CNP

## 2024-04-04 NOTE — ASSESSMENT & PLAN NOTE
Glucoscans with sliding scale insulin  Insulin  Consistent carb/liberal diabetic diet  No evidence of any hypogylcemia

## 2024-04-04 NOTE — PROGRESS NOTES
PROGRESS NOTE    Subjective   Chief complaint: Jesus Lantigua is a 83 y.o. male who is an acute skilled patient being seen and evaluated for weakness    HPI:  HPI  Therapy has evaluated patient and establish plan of care and goals.  Patient was seen and examined at bedside, appears to be in no acute distress.  Denies chest pain or shortness of breath.  Afebrile.  Nursing staff voicing no new concerns at this time.    Objective   Vital signs: 120/63, 98.1, 9119 blood sugar 183, 96%    Physical Exam  Vitals reviewed.   Constitutional:       Appearance: Normal appearance.   HENT:      Head: Normocephalic and atraumatic.   Cardiovascular:      Rate and Rhythm: Normal rate and regular rhythm.   Pulmonary:      Effort: Pulmonary effort is normal.      Breath sounds: Normal breath sounds.   Abdominal:      General: Bowel sounds are normal.      Palpations: Abdomen is soft.   Musculoskeletal:      Cervical back: Neck supple.   Skin:     General: Skin is warm and dry.   Neurological:      General: No focal deficit present.      Mental Status: He is alert.      Motor: Weakness present.   Psychiatric:         Mood and Affect: Mood normal.         Behavior: Behavior is cooperative.         Assessment/Plan   Problem List Items Addressed This Visit       Essential (primary) hypertension     Monitor blood pressure  Metoprolol         Parkinson's disease (CMS/HCC)     Carbidopa levodopa  Monitor         Type 2 diabetes mellitus without complication (CMS/[facility])     Glucoscans with sliding scale insulin  Insulin  Consistent carb/liberal diabetic diet         Weakness - Primary     Continue working in therapy towards goals         Seizure disorder (CMS/HCC)     Phenobarbital  Monitor for seizure activity          Medications, treatments, and labs reviewed  Continue medications and treatments as listed in EMR      Scribe Attestation  I, Fiona Mauro   attest that this documentation has been prepared under the direction and in  the presence of Steven Muir MD    Provider Attestation - Scribe documentation  All medical record entries made by the Scribe were at my direction and personally dictated by me. I have reviewed the chart and agree that the record accurately reflects my personal performance of the history, physical exam, discussion and plan.   Steven Muir MD

## 2024-04-08 ENCOUNTER — NURSING HOME VISIT (OUTPATIENT)
Dept: POST ACUTE CARE | Facility: EXTERNAL LOCATION | Age: 84
End: 2024-04-08
Payer: COMMERCIAL

## 2024-04-08 DIAGNOSIS — E11.9 TYPE 2 DIABETES MELLITUS WITHOUT COMPLICATION, WITH LONG-TERM CURRENT USE OF INSULIN (MULTI): ICD-10-CM

## 2024-04-08 DIAGNOSIS — G40.909 SEIZURE DISORDER (MULTI): ICD-10-CM

## 2024-04-08 DIAGNOSIS — G20.A1 PARKINSON'S DISEASE, UNSPECIFIED WHETHER DYSKINESIA PRESENT, UNSPECIFIED WHETHER MANIFESTATIONS FLUCTUATE (MULTI): ICD-10-CM

## 2024-04-08 DIAGNOSIS — I10 ESSENTIAL (PRIMARY) HYPERTENSION: ICD-10-CM

## 2024-04-08 DIAGNOSIS — Z79.4 TYPE 2 DIABETES MELLITUS WITHOUT COMPLICATION, WITH LONG-TERM CURRENT USE OF INSULIN (MULTI): ICD-10-CM

## 2024-04-08 DIAGNOSIS — R53.1 WEAKNESS: ICD-10-CM

## 2024-04-08 PROCEDURE — 99308 SBSQ NF CARE LOW MDM 20: CPT | Performed by: INTERNAL MEDICINE

## 2024-04-08 NOTE — PROGRESS NOTES
PROGRESS NOTE    Subjective   Chief complaint: Jesus Lanitgua is a 83 y.o. male who is an acute skilled patient being seen and evaluated for weakness    HPI:  Patient has been working in therapy to improve strength, endurance, and ADLs.  Patient continues to work toward goals. Pt ambulated with RW with SBA/S for distance of 65ft and 45ft. Pt performed seated dynamic balance activity seated, unsupported with CGA/SBA. Seated dynamic balance- F+. Pt seated, unsupported for >15 min. Pt performed neuro cherri in standing pos. with UE support x2/x1 with CGA with mod VC on LE positioning and sequencing with 75% carryover with 0 LOB at outside of //. Working with OT to address self care management. Pt tolerated up to 3 min intervals of ADL completion.  No new concerns today.  Denies n/v/f/c pain.        Objective   Vital signs: 118/76,80,96%,     Physical Exam  Vitals and nursing note reviewed.   Constitutional:       General: He is not in acute distress.  Eyes:      Extraocular Movements: Extraocular movements intact.   Cardiovascular:      Rate and Rhythm: Normal rate and regular rhythm.   Pulmonary:      Effort: Pulmonary effort is normal.      Breath sounds: Normal breath sounds.      Comments: Lungs clear   Abdominal:      General: Bowel sounds are normal.      Palpations: Abdomen is soft.   Musculoskeletal:      Cervical back: Neck supple.      Right lower leg: No edema.      Left lower leg: No edema.   Neurological:      Mental Status: He is alert.   Psychiatric:         Mood and Affect: Mood normal.         Behavior: Behavior is cooperative.         Assessment/Plan   Problem List Items Addressed This Visit       Essential (primary) hypertension     Monitor blood pressure; currently at goal  Metoprolol         Parkinson's disease (CMS/[facility])     Carbidopa levodopa  Monitor         Type 2 diabetes mellitus without complication (CMS/[facility])     Glucoscans with sliding scale insulin  Insulin  Consistent carb/liberal  diabetic diet  No evidence of any hypogylcemia          Weakness     Continue working in therapy towards goals         Seizure disorder (CMS/[facility])     Phenobarbital  Monitor for seizure activity          Medications, treatments, and labs reviewed  Continue medications and treatments as listed in EMR    Scribe Attestation  I, Fiona Fernandez   attest that this documentation has been prepared under the direction and in the presence of Steven Muir MD.     Provider Attestation - Scribe documentation  All medical record entries made by the Scribe were at my direction and personally dictated by me. I have reviewed the chart and agree that the record accurately reflects my personal performance of the history, physical exam, discussion and plan.   Steven Muir MD

## 2024-04-08 NOTE — LETTER
Patient: Jesus Lantigua  : 1940    Encounter Date: 2024    PROGRESS NOTE    Subjective  Chief complaint: Jesus Lantigua is a 83 y.o. male who is an acute skilled patient being seen and evaluated for weakness    HPI:  Patient has been working in therapy to improve strength, endurance, and ADLs.  Patient continues to work toward goals. Pt ambulated with RW with SBA/S for distance of 65ft and 45ft. Pt performed seated dynamic balance activity seated, unsupported with CGA/SBA. Seated dynamic balance- F+. Pt seated, unsupported for >15 min. Pt performed neuro cherri in standing pos. with UE support x2/x1 with CGA with mod VC on LE positioning and sequencing with 75% carryover with 0 LOB at outside of //. Working with OT to address self care management. Pt tolerated up to 3 min intervals of ADL completion.  No new concerns today.  Denies n/v/f/c pain.        Objective  Vital signs: 118/76,80,96%,     Physical Exam  Vitals and nursing note reviewed.   Constitutional:       General: He is not in acute distress.  Eyes:      Extraocular Movements: Extraocular movements intact.   Cardiovascular:      Rate and Rhythm: Normal rate and regular rhythm.   Pulmonary:      Effort: Pulmonary effort is normal.      Breath sounds: Normal breath sounds.      Comments: Lungs clear   Abdominal:      General: Bowel sounds are normal.      Palpations: Abdomen is soft.   Musculoskeletal:      Cervical back: Neck supple.      Right lower leg: No edema.      Left lower leg: No edema.   Neurological:      Mental Status: He is alert.   Psychiatric:         Mood and Affect: Mood normal.         Behavior: Behavior is cooperative.         Assessment/Plan  Problem List Items Addressed This Visit       Essential (primary) hypertension     Monitor blood pressure; currently at goal  Metoprolol         Parkinson's disease (CMS/[facility])     Carbidopa levodopa  Monitor         Type 2 diabetes mellitus without complication (CMS/[facility])      Glucoscans with sliding scale insulin  Insulin  Consistent carb/liberal diabetic diet  No evidence of any hypogylcemia          Weakness     Continue working in therapy towards goals         Seizure disorder (CMS/[facility])     Phenobarbital  Monitor for seizure activity          Medications, treatments, and labs reviewed  Continue medications and treatments as listed in EMR    Scribe Attestation  Dahlia DEGROOT Scribe   attest that this documentation has been prepared under the direction and in the presence of Steven Muir MD.     Provider Attestation - Scribe documentation  All medical record entries made by the Scribe were at my direction and personally dictated by me. I have reviewed the chart and agree that the record accurately reflects my personal performance of the history, physical exam, discussion and plan.   Steven Muir MD      Electronically Signed By: Steven Muir MD   4/8/24  7:38 PM

## 2024-04-09 ENCOUNTER — NURSING HOME VISIT (OUTPATIENT)
Dept: POST ACUTE CARE | Facility: EXTERNAL LOCATION | Age: 84
End: 2024-04-09
Payer: COMMERCIAL

## 2024-04-09 DIAGNOSIS — R53.1 WEAKNESS: Primary | ICD-10-CM

## 2024-04-09 DIAGNOSIS — I10 ESSENTIAL (PRIMARY) HYPERTENSION: ICD-10-CM

## 2024-04-09 DIAGNOSIS — G20.A1 PARKINSON'S DISEASE, UNSPECIFIED WHETHER DYSKINESIA PRESENT, UNSPECIFIED WHETHER MANIFESTATIONS FLUCTUATE (MULTI): ICD-10-CM

## 2024-04-09 DIAGNOSIS — G40.909 SEIZURE DISORDER (MULTI): ICD-10-CM

## 2024-04-09 PROCEDURE — 99316 NF DSCHRG MGMT 30 MIN+: CPT | Performed by: NURSE PRACTITIONER

## 2024-04-09 NOTE — LETTER
Patient: Jesus Lantigua  : 1940    Encounter Date: 2024      PROGRESS NOTE    Subjective  Chief complaint: Jesus Lantigua is a 83 y.o. male who is an acute skilled patient being seen and evaluated for weakness    HPI: therapist reports that he is making good progress. Is scheduled for discharge to his assisted living apartment this week. Social service to schedule home care referral and therapy. Reviwed with him his current medications.   Stressed safety in his home.   Instructed to follow up with his private physician within 2 weeks of discharge.    HPI      Objective  Vital signs: 126/72-82-18-97.3     Physical Exam  Vitals and nursing note reviewed.   Constitutional:       General: He is not in acute distress.  Eyes:      Extraocular Movements: Extraocular movements intact.   Cardiovascular:      Rate and Rhythm: Normal rate and regular rhythm.   Pulmonary:      Effort: Pulmonary effort is normal.      Breath sounds: Normal breath sounds.      Comments: Lungs clear   Abdominal:      General: Bowel sounds are normal.      Palpations: Abdomen is soft.   Musculoskeletal:      Cervical back: Neck supple.      Right lower leg: No edema.      Left lower leg: No edema.   Neurological:      Mental Status: He is alert.   Psychiatric:         Mood and Affect: Mood normal.         Behavior: Behavior is cooperative.       Admission/ Discharge diagnoses:  Assessment/Plan  Problem List Items Addressed This Visit       Essential (primary) hypertension     Monitor blood pressure; currently at goal  Metoprolol  Lungs clear          Parkinson's disease (CMS/HCC)     Carbidopa levodopa  Monitor  Therapy          Weakness - Primary     Has met goals for therapy. Is scheduled for discharge this week.   To be referred to home care and therapy  Stressed safety at all times.   Instructed to schedule follow up appointment with private physician within 2 weeks following discharge   Reviewed medications          Seizure  disorder (CMS/[facility])     Phenobarbital  Monitor for seizure activity          Discharge prognosis - fair   Medications, treatments, and labs reviewed  Continue medications and treatments as listed in EMR  Visit >45minutes   JACKIE Jones      Electronically Signed By: JACKIE Jones   4/11/24  7:57 PM

## 2024-04-10 ENCOUNTER — NURSING HOME VISIT (OUTPATIENT)
Dept: POST ACUTE CARE | Facility: EXTERNAL LOCATION | Age: 84
End: 2024-04-10

## 2024-04-10 DIAGNOSIS — R53.1 WEAKNESS: Primary | ICD-10-CM

## 2024-04-10 DIAGNOSIS — E11.9 TYPE 2 DIABETES MELLITUS WITHOUT COMPLICATION, UNSPECIFIED WHETHER LONG TERM INSULIN USE (MULTI): ICD-10-CM

## 2024-04-10 DIAGNOSIS — G20.A1 PARKINSON'S DISEASE, UNSPECIFIED WHETHER DYSKINESIA PRESENT, UNSPECIFIED WHETHER MANIFESTATIONS FLUCTUATE (MULTI): ICD-10-CM

## 2024-04-10 DIAGNOSIS — I10 ESSENTIAL (PRIMARY) HYPERTENSION: ICD-10-CM

## 2024-04-10 NOTE — LETTER
Patient: Jesus Lantigua  : 1940    Encounter Date: 04/10/2024    PROGRESS NOTE    Subjective  Chief complaint: Jesus Lantigua is a 83 y.o. male who is a Skilled patient being seen and evaluated for monthly general medical care and follow-up.    HPI:  HPI   patient presents for general medical care and f/u.  Patient seen and examined at bedside.  No issues per nursing.  Patient has no acute complaints.   Patient continues working in therapy due to weakness and debility. He is up walking with walker and CGA. He is getting stronger and is feeling well.  Seizures controlled with no breakthrough seizure activity.  Tolerating antiepileptic with no increased drowsiness or sedation.  Patient with Dx of Parkinson.  Denies increased tremor, stiffness, or changes in functional ability.  HTN BP at goal.  Denies chest pain and headache.  No acute distress.  Objective  Vital signs:   118/76, 98.1, 80, 17, 96%     Physical Exam  Constitutional:       General: He is not in acute distress.  Eyes:      Extraocular Movements: Extraocular movements intact.   Cardiovascular:      Rate and Rhythm: Normal rate and regular rhythm.   Pulmonary:      Effort: Pulmonary effort is normal.      Breath sounds: Normal breath sounds.   Abdominal:      General: Bowel sounds are normal.      Palpations: Abdomen is soft.   Musculoskeletal:      Cervical back: Neck supple.      Right lower leg: No edema.      Left lower leg: No edema.   Neurological:      Mental Status: He is alert.   Psychiatric:         Mood and Affect: Mood normal.         Behavior: Behavior is cooperative.         Assessment/Plan  Problem List Items Addressed This Visit       Essential (primary) hypertension     Monitor blood pressure; currently at goal  Metoprolol         Parkinson's disease (CMS/[facility])     Carbidopa levodopa  Monitor  Therapy          Type 2 diabetes mellitus without complication (CMS/[facility])     Glucoscans with sliding scale insulin  Insulin  Consistent  carb/liberal diabetic diet         Weakness - Primary     Continue working in therapy towards goals            Medications, treatments, and labs reviewed  Continue medications and treatments as listed in PCC      1. Weakness        2. Essential (primary) hypertension        3. Type 2 diabetes mellitus without complication, unspecified whether long term insulin use (CMS/[facility])        4. Parkinson's disease, unspecified whether dyskinesia present, unspecified whether manifestations fluctuate (CMS/[facility])                 Electronically Signed By: Steven Muir MD   4/11/24  4:53 PM

## 2024-04-11 NOTE — PROGRESS NOTES
PROGRESS NOTE    Subjective   Chief complaint: Jesus Lantigua is a 83 y.o. male who is an acute skilled patient being seen and evaluated for weakness    HPI: therapist reports that he is making good progress. Is scheduled for discharge to his assisted living apartment this week. Social service to schedule home care referral and therapy. Reviwed with him his current medications.   Stressed safety in his home.   Instructed to follow up with his private physician within 2 weeks of discharge.    HPI      Objective   Vital signs: 126/72-82-18-97.3     Physical Exam  Vitals and nursing note reviewed.   Constitutional:       General: He is not in acute distress.  Eyes:      Extraocular Movements: Extraocular movements intact.   Cardiovascular:      Rate and Rhythm: Normal rate and regular rhythm.   Pulmonary:      Effort: Pulmonary effort is normal.      Breath sounds: Normal breath sounds.      Comments: Lungs clear   Abdominal:      General: Bowel sounds are normal.      Palpations: Abdomen is soft.   Musculoskeletal:      Cervical back: Neck supple.      Right lower leg: No edema.      Left lower leg: No edema.   Neurological:      Mental Status: He is alert.   Psychiatric:         Mood and Affect: Mood normal.         Behavior: Behavior is cooperative.       Admission/ Discharge diagnoses:  Assessment/Plan   Problem List Items Addressed This Visit       Essential (primary) hypertension     Monitor blood pressure; currently at goal  Metoprolol  Lungs clear          Parkinson's disease (CMS/HCC)     Carbidopa levodopa  Monitor  Therapy          Weakness - Primary     Has met goals for therapy. Is scheduled for discharge this week.   To be referred to home care and therapy  Stressed safety at all times.   Instructed to schedule follow up appointment with private physician within 2 weeks following discharge   Reviewed medications          Seizure disorder (CMS/HCC)     Phenobarbital  Monitor for seizure activity           Discharge prognosis - fair   Medications, treatments, and labs reviewed  Continue medications and treatments as listed in EMR  Visit >45minutes   Rola Enamorado, APRN-CNP

## 2024-04-11 NOTE — ASSESSMENT & PLAN NOTE
Has met goals for therapy. Is scheduled for discharge this week.   To be referred to home care and therapy  Stressed safety at all times.   Instructed to schedule follow up appointment with private physician within 2 weeks following discharge   Reviewed medications

## 2024-04-11 NOTE — PROGRESS NOTES
PROGRESS NOTE    Subjective   Chief complaint: Jesus Lantigua is a 83 y.o. male who is a Skilled patient being seen and evaluated for monthly general medical care and follow-up.    HPI:  HPI   patient presents for general medical care and f/u.  Patient seen and examined at bedside.  No issues per nursing.  Patient has no acute complaints.   Patient continues working in therapy due to weakness and debility. He is up walking with walker and CGA. He is getting stronger and is feeling well.  Seizures controlled with no breakthrough seizure activity.  Tolerating antiepileptic with no increased drowsiness or sedation.  Patient with Dx of Parkinson.  Denies increased tremor, stiffness, or changes in functional ability.  HTN BP at goal.  Denies chest pain and headache.  No acute distress.  Objective   Vital signs:   118/76, 98.1, 80, 17, 96%     Physical Exam  Constitutional:       General: He is not in acute distress.  Eyes:      Extraocular Movements: Extraocular movements intact.   Cardiovascular:      Rate and Rhythm: Normal rate and regular rhythm.   Pulmonary:      Effort: Pulmonary effort is normal.      Breath sounds: Normal breath sounds.   Abdominal:      General: Bowel sounds are normal.      Palpations: Abdomen is soft.   Musculoskeletal:      Cervical back: Neck supple.      Right lower leg: No edema.      Left lower leg: No edema.   Neurological:      Mental Status: He is alert.   Psychiatric:         Mood and Affect: Mood normal.         Behavior: Behavior is cooperative.         Assessment/Plan   Problem List Items Addressed This Visit       Essential (primary) hypertension     Monitor blood pressure; currently at goal  Metoprolol         Parkinson's disease (CMS/HCC)     Carbidopa levodopa  Monitor  Therapy          Type 2 diabetes mellitus without complication (CMS/HCC)     Glucoscans with sliding scale insulin  Insulin  Consistent carb/liberal diabetic diet         Weakness - Primary     Continue  working in therapy towards goals            Medications, treatments, and labs reviewed  Continue medications and treatments as listed in PCC      1. Weakness        2. Essential (primary) hypertension        3. Type 2 diabetes mellitus without complication, unspecified whether long term insulin use (CMS/[facility])        4. Parkinson's disease, unspecified whether dyskinesia present, unspecified whether manifestations fluctuate (CMS/[facility])

## 2024-12-09 ENCOUNTER — NURSING HOME VISIT (OUTPATIENT)
Dept: POST ACUTE CARE | Facility: EXTERNAL LOCATION | Age: 84
End: 2024-12-09
Payer: COMMERCIAL

## 2024-12-09 DIAGNOSIS — N17.9 ACUTE KIDNEY INJURY SUPERIMPOSED ON CKD (CMS-HCC): ICD-10-CM

## 2024-12-09 DIAGNOSIS — I25.10 CORONARY ARTERY DISEASE INVOLVING NATIVE CORONARY ARTERY OF NATIVE HEART WITHOUT ANGINA PECTORIS: ICD-10-CM

## 2024-12-09 DIAGNOSIS — N18.9 ACUTE KIDNEY INJURY SUPERIMPOSED ON CKD (CMS-HCC): ICD-10-CM

## 2024-12-09 DIAGNOSIS — G20.A1 PARKINSON'S DISEASE, UNSPECIFIED WHETHER DYSKINESIA PRESENT, UNSPECIFIED WHETHER MANIFESTATIONS FLUCTUATE: ICD-10-CM

## 2024-12-09 DIAGNOSIS — R53.81 PHYSICAL DECONDITIONING: ICD-10-CM

## 2024-12-09 DIAGNOSIS — I10 ESSENTIAL (PRIMARY) HYPERTENSION: ICD-10-CM

## 2024-12-09 DIAGNOSIS — I72.8: ICD-10-CM

## 2024-12-09 DIAGNOSIS — G40.909 SEIZURE DISORDER (MULTI): ICD-10-CM

## 2024-12-09 DIAGNOSIS — E78.5 HYPERLIPIDEMIA, UNSPECIFIED HYPERLIPIDEMIA TYPE: ICD-10-CM

## 2024-12-09 DIAGNOSIS — E11.9 TYPE 2 DIABETES MELLITUS WITHOUT COMPLICATION, UNSPECIFIED WHETHER LONG TERM INSULIN USE (MULTI): ICD-10-CM

## 2024-12-09 DIAGNOSIS — S22.49XD CLOSED FRACTURE OF MULTIPLE RIBS WITH ROUTINE HEALING, UNSPECIFIED LATERALITY, SUBSEQUENT ENCOUNTER: ICD-10-CM

## 2024-12-09 DIAGNOSIS — R40.20: Primary | ICD-10-CM

## 2024-12-09 PROCEDURE — 99497 ADVNCD CARE PLAN 30 MIN: CPT | Performed by: INTERNAL MEDICINE

## 2024-12-09 PROCEDURE — 99306 1ST NF CARE HIGH MDM 50: CPT | Performed by: INTERNAL MEDICINE

## 2024-12-09 NOTE — PROGRESS NOTES
Nursing Home  History & Physical Visit    Name: Jesus Lantigua  MRN: [id number]  YOB: 1940  Date of Service: 12/9/2024      History of Present Illness  Pt was seen , available records reviewed. Hx mainly from chart and some from patient. Pt admits to forget things at times. He was in hosp with loss of unconsciousness , thought to be due to his hx of seizure disorder. He had neg CT brain for acute stroke . During reexcitation at hosp he had rib fracture . He has hx of parkinson, DM and HTN also  Lives in assisted living for a year or so.     Has some rib cage pain on deep palpation   Patient denies any shortness of breath, PND, orthopnea, , palpitation,  or edema in legs  patient denies any abdominal pain, tenderness, nausea, vomiting, change in bowel habits or blood in stool.          Review of Systems  REVIEW OF SYSTEMS:   All other systems have been reviewed and are negative in relation to patient's complaint and other than what is mentioned in History of present illness.     Vital Signs  Temperature  97.6 °F  12/09/2024 10:05  Pulse  73 per minute  12/09/2024 10:05  Respirations  18 per minute  12/09/2024 10:05  Blood Pressure  102 / 58 mmHg  12/09/2024 10:05  O2 Saturation  97 %  12/09/2024 10:05  Blood Sugar  258 mg/dL  12/09/2024 09:01  Weight  176.4 lbs / Admission BMI: 25.31  12/03/2024 18:00  Physical Exam  Vitals noted , [place]   Not in acute distress  Conj Pink, No icterus  No bruise on chest wall. Some local tenderness on deep palpation   Neck: No Cervical LN enlargement, No Thyroid enlargement   Lungs: good air entry bilaterally, no rales or rhonchi  CVS: S1 S2 + , no S3. No loud heart murmur heard.   Abdomen: Soft, non tender , BS +. no organomegaly , no CVA tenderness  CNS: Pt is alert, moving all 4 extremities. no motor weakness or abnormal movements noted on gross examination.  No spine tenderness  Extremities: No edema, No calf tenderness, Chivo's sign negative.     Assessment/Plan:    1.  Unconsciousness (Multi) ? Due to seizure vs other etiology    2. Seizure disorder (Multi) -hx of.    3. Essential (primary) hypertension -controlled     4. Type 2 diabetes mellitus without complication, unspecified whether long term insulin use (Multi)    5. Parkinson's disease, unspecified whether dyskinesia present, unspecified whether manifestations fluctuate    6. Physical deconditioning -will get therapy    7. Acute kidney injury superimposed on CKD (CMS-HCC) -improved   8. 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 No surgical intervention at present    9. Closed fracture of multiple ribs with routine healing, unspecified laterality, subsequent encounter    10. Hyperlipidemia, unspecified hyperlipidemia type    11. Coronary artery disease involving native coronary artery of native heart witho  ut angina pectoris -stable        Plan:     Available medical records reviewed . Patient was examined and detailed History and physical done . All questions answered to patient's satisfaction . Total time for chart reviewing , detailed examination and coordinating care with patient was > 35 minutes.   During visit today , I asked patient as well as looked in records  in regards to advanced directive , POA etc. His daughter Emely is health POA . . Pt wants to be DNR CCA . Questions related to it answered to pt's satisfaction .    Patient is doing well.   Continue OT/PT Rehab.   Pain control  Current medications are effective. advised to continue current medications.  Will continue to follow   Patient felt satisfied with plan  .

## 2024-12-09 NOTE — LETTER
Patient: Jesus Lantigua  : 1940    Encounter Date: 2024    Nursing Home  History & Physical Visit    Name: Jesus Lantigua  MRN: [id number]  YOB: 1940  Date of Service: 2024      History of Present Illness  Pt was seen , available records reviewed. Hx mainly from chart and some from patient. Pt admits to forget things at times. He was in hosp with loss of unconsciousness , thought to be due to his hx of seizure disorder. He had neg CT brain for acute stroke . During reexcitation at hosp he had rib fracture . He has hx of parkinson, DM and HTN also  Lives in assisted living for a year or so.     Has some rib cage pain on deep palpation   Patient denies any shortness of breath, PND, orthopnea, , palpitation,  or edema in legs  patient denies any abdominal pain, tenderness, nausea, vomiting, change in bowel habits or blood in stool.          Review of Systems  REVIEW OF SYSTEMS:   All other systems have been reviewed and are negative in relation to patient's complaint and other than what is mentioned in History of present illness.     Vital Signs  Temperature  97.6 °F  2024 10:05  Pulse  73 per minute  2024 10:05  Respirations  18 per minute  2024 10:05  Blood Pressure  102 / 58 mmHg  2024 10:05  O2 Saturation  97 %  2024 10:05  Blood Sugar  258 mg/dL  2024 09:01  Weight  176.4 lbs / Admission BMI: 25.31  2024 18:00  Physical Exam  Vitals noted , [place]   Not in acute distress  Conj Pink, No icterus  No bruise on chest wall. Some local tenderness on deep palpation   Neck: No Cervical LN enlargement, No Thyroid enlargement   Lungs: good air entry bilaterally, no rales or rhonchi  CVS: S1 S2 + , no S3. No loud heart murmur heard.   Abdomen: Soft, non tender , BS +. no organomegaly , no CVA tenderness  CNS: Pt is alert, moving all 4 extremities. no motor weakness or abnormal movements noted on gross examination.  No spine tenderness  Extremities: No  edema, No calf tenderness, Chivo's sign negative.     Assessment/Plan:    1. Unconsciousness (Multi) ? Due to seizure vs other etiology    2. Seizure disorder (Multi) -hx of.    3. Essential (primary) hypertension -controlled     4. Type 2 diabetes mellitus without complication, unspecified whether long term insulin use (Multi)    5. Parkinson's disease, unspecified whether dyskinesia present, unspecified whether manifestations fluctuate    6. Physical deconditioning -will get therapy    7. Acute kidney injury superimposed on CKD (CMS-HCC) -improved   8. 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 No surgical intervention at present    9. Closed fracture of multiple ribs with routine healing, unspecified laterality, subsequent encounter    10. Hyperlipidemia, unspecified hyperlipidemia type    11. Coronary artery disease involving native coronary artery of native heart witho  ut angina pectoris -stable        Plan:     Available medical records reviewed . Patient was examined and detailed History and physical done . All questions answered to patient's satisfaction . Total time for chart reviewing , detailed examination and coordinating care with patient was > 35 minutes.   During visit today , I asked patient as well as looked in records  in regards to advanced directive , POA etc. His daughter Emely is health POA . . Pt wants to be DNR CCA . Questions related to it answered to pt's satisfaction .    Patient is doing well.   Continue OT/PT Rehab.   Pain control  Current medications are effective. advised to continue current medications.  Will continue to follow   Patient felt satisfied with plan  .      Electronically Signed By: Earle Lagunas MD   12/9/24 12:28 PM

## 2024-12-12 ENCOUNTER — NURSING HOME VISIT (OUTPATIENT)
Dept: POST ACUTE CARE | Facility: EXTERNAL LOCATION | Age: 84
End: 2024-12-12
Payer: COMMERCIAL

## 2024-12-12 DIAGNOSIS — G20.A1 PARKINSON'S DISEASE, UNSPECIFIED WHETHER DYSKINESIA PRESENT, UNSPECIFIED WHETHER MANIFESTATIONS FLUCTUATE: ICD-10-CM

## 2024-12-12 DIAGNOSIS — R40.20: Primary | ICD-10-CM

## 2024-12-12 DIAGNOSIS — G40.909 SEIZURE DISORDER (MULTI): ICD-10-CM

## 2024-12-12 DIAGNOSIS — E11.9 TYPE 2 DIABETES MELLITUS WITHOUT COMPLICATION, UNSPECIFIED WHETHER LONG TERM INSULIN USE (MULTI): ICD-10-CM

## 2024-12-12 DIAGNOSIS — I10 ESSENTIAL (PRIMARY) HYPERTENSION: ICD-10-CM

## 2024-12-12 DIAGNOSIS — R53.81 PHYSICAL DECONDITIONING: ICD-10-CM

## 2024-12-12 PROCEDURE — 99308 SBSQ NF CARE LOW MDM 20: CPT | Performed by: INTERNAL MEDICINE

## 2024-12-12 NOTE — LETTER
Patient: Jesus Lantigua  : 1940    Encounter Date: 2024        Nursing home follow up visit  Name: Jesus Lantigua  MRN: [id number]  YOB: 1940  Date of Service: 24      Pt was evaluated during nursing home visit today  Patient is doing OK. Participating in therapy well  No sob, cp, PND, orthopnea  Eating ok, sleeping ok   Moving BM ok.   Tolerating medications well    Objective :  Temperature  97.4 °F  2024 11:11  Pulse  77 per minute  2024 11:11  Respirations  16 per minute  2024 11:11  Blood Pressure  110 / 54 mmHg  2024 11:11  O2 Saturation  97 %  2024 11:11  Blood Sugar  171 mg/dL  2024 16:15  Weight  173.2 lbs / Routine BMI: 24.85  12/10/2024 12:12  Vitals were noted  Patient is not any acute distress  Conjunctiva- Pink, no icterus   No cervical LN enlargement   Lungs clear, s1s2 +   No edema , No calf tenderness     Assessment:    1. Unconsciousness (Multi) -which led to hospitalization . Ok here at SNF   2. Seizure disorder (Multi) -hx of.    3. Essential (primary) hypertension -controlled     4. Type 2 diabetes mellitus without complication, unspecified whether long term insulin use (Multi)    5. Parkinson's disease, unspecified whether dyskinesia present, unspecified whether manifestations fluctuate -hx of   6. Physical deconditioning -getting therapy         Plan:  Patient is doing well.   Continue OT/PT Rehab.   I have reviewed all active medications patient is currently on . Questions related to medication answered to patient's satisfaction.  Current medications are effective. advised to continue current medications.  Will continue to follow   Patient felt satisfied with plan            Electronically Signed By: Earle Lagunas MD   24  8:05 PM

## 2024-12-13 NOTE — PROGRESS NOTES
Nursing home follow up visit  Name: Jesus Lantigua  MRN: [id number]  YOB: 1940  Date of Service: 12/11/24      Pt was evaluated during nursing home visit today  Patient is doing OK. Participating in therapy well  No sob, cp, PND, orthopnea  Eating ok, sleeping ok   Moving BM ok.   Tolerating medications well    Objective :  Temperature  97.4 °F  12/12/2024 11:11  Pulse  77 per minute  12/12/2024 11:11  Respirations  16 per minute  12/12/2024 11:11  Blood Pressure  110 / 54 mmHg  12/12/2024 11:11  O2 Saturation  97 %  12/12/2024 11:11  Blood Sugar  171 mg/dL  12/12/2024 16:15  Weight  173.2 lbs / Routine BMI: 24.85  12/10/2024 12:12  Vitals were noted  Patient is not any acute distress  Conjunctiva- Pink, no icterus   No cervical LN enlargement   Lungs clear, s1s2 +   No edema , No calf tenderness     Assessment:    1. Unconsciousness (Multi) -which led to hospitalization . Ok here at SNF   2. Seizure disorder (Multi) -hx of.    3. Essential (primary) hypertension -controlled     4. Type 2 diabetes mellitus without complication, unspecified whether long term insulin use (Multi)    5. Parkinson's disease, unspecified whether dyskinesia present, unspecified whether manifestations fluctuate -hx of   6. Physical deconditioning -getting therapy         Plan:  Patient is doing well.   Continue OT/PT Rehab.   I have reviewed all active medications patient is currently on . Questions related to medication answered to patient's satisfaction.  Current medications are effective. advised to continue current medications.  Will continue to follow   Patient felt satisfied with plan

## 2025-05-15 ENCOUNTER — NURSING HOME VISIT (OUTPATIENT)
Dept: POST ACUTE CARE | Facility: EXTERNAL LOCATION | Age: 85
End: 2025-05-15
Payer: COMMERCIAL

## 2025-05-15 DIAGNOSIS — R41.0 CONFUSION: Primary | ICD-10-CM

## 2025-05-15 DIAGNOSIS — R53.81 PHYSICAL DECONDITIONING: ICD-10-CM

## 2025-05-15 DIAGNOSIS — F02.B0 MODERATE DEMENTIA DUE TO PARKINSON'S DISEASE, WITHOUT BEHAVIORAL DISTURBANCE, PSYCHOTIC DISTURBANCE, MOOD DISTURBANCE, OR ANXIETY (MULTI): ICD-10-CM

## 2025-05-15 DIAGNOSIS — E11.9 TYPE 2 DIABETES MELLITUS WITHOUT COMPLICATION, UNSPECIFIED WHETHER LONG TERM INSULIN USE: ICD-10-CM

## 2025-05-15 DIAGNOSIS — R41.841 COGNITIVE COMMUNICATION DEFICIT: ICD-10-CM

## 2025-05-15 DIAGNOSIS — N18.1 CHRONIC KIDNEY DISEASE, STAGE 1: ICD-10-CM

## 2025-05-15 DIAGNOSIS — I10 ESSENTIAL (PRIMARY) HYPERTENSION: ICD-10-CM

## 2025-05-15 DIAGNOSIS — G20.A1 MODERATE DEMENTIA DUE TO PARKINSON'S DISEASE, WITHOUT BEHAVIORAL DISTURBANCE, PSYCHOTIC DISTURBANCE, MOOD DISTURBANCE, OR ANXIETY (MULTI): ICD-10-CM

## 2025-05-15 DIAGNOSIS — Z91.81 HX OF FALL: ICD-10-CM

## 2025-05-15 DIAGNOSIS — G20.A1 PARKINSON'S DISEASE, UNSPECIFIED WHETHER DYSKINESIA PRESENT, UNSPECIFIED WHETHER MANIFESTATIONS FLUCTUATE: ICD-10-CM

## 2025-05-15 PROCEDURE — 99306 1ST NF CARE HIGH MDM 50: CPT | Performed by: INTERNAL MEDICINE

## 2025-05-15 PROCEDURE — 99497 ADVNCD CARE PLAN 30 MIN: CPT | Performed by: INTERNAL MEDICINE

## 2025-05-15 NOTE — PROGRESS NOTES
Nursing Home  History & Physical Visit    Name: Jesus Lantigua  MRN: [id number]  YOB: 1940  Date of Service: 5/15/2025      History of Present Illness  Chart reviewed. Hx mainly from chart as pt has dementia. He was admitted to [facility] with increase confusion , some difficulty with speech . He has hx of falls in past too. He has hx of parkinson and dementia. CT brain was neg. He was stabilized at [facility] and now here for rehab.     Hx of htn , DM and past surgical hx of surgery on ankle .  He lives in assisted living place in [place] as per him for a yr or so.     Review of Systems  REVIEW OF SYSTEMS:   All other systems have been reviewed and are negative in relation to patient's complaint and other than what is mentioned in History of present illness.     Vital Signs  Temperature  97.4 °F  05/15/2025 02:06  Pulse  81 per minute  05/15/2025 02:06  Respirations  17 per minute  05/15/2025 09:54  Blood Pressure  108 / 55 mmHg  05/15/2025 09:54  O2 Saturation  94 %  05/15/2025 02:06  Blood Sugar  234 mg/dL  05/15/2025 09:47  Weight  174.8 lbs / Admission BMI: 25.08  05/13/2025 22:23  Physical Exam  Vitals noted , stable  Not in acute distress  Conj Pink, No icterus  Neck: No Cervical LN enlargement, No Thyroid enlargement   Lungs: good air entry bilaterally, occ L basilar rales but no  rhonchi  CVS: S1 S2 + , no S3. No loud heart murmur heard.   Abdomen: Soft, non tender , BS +. no organomegaly , no CVA tenderness  CNS: Pt is alert, pleasantly demented, doesnot know what year it is. moving all 4 extremities. no motor weakness or abnormal movements noted on gross examination.  No spine tenderness  Extremities: No edema, No calf tenderness, Chivo's sign negative.     Assessment/Plan:    1. Confusion no new stroke noted   2. Parkinson's disease, unspecified whether dyskinesia present, unspecified whether manifestations fluctuate hx of. On med.    3. Moderate dementia due to Parkinson's disease, without behavioral  disturbance, psychotic disturbance, mood disturbance, or anxiety (Multi) hx of.    4. Essential (primary) hypertension - controlled   5. Type 2 diabetes mellitus without complication, unspecified whether long term insulin use    6. Physical deconditioning -will get therapy    7. Hx of fall    8. Chronic kidney disease, stage 1    9. Cognitive communication deficit -speech seems to be ok currently         Plan:     Available medical records reviewed . Patient was examined and detailed History and physical done . All questions answered to patient's satisfaction . Total time for chart reviewing , detailed examination and coordinating care with patient was > 35 minutes.   During visit today , I asked patient as well as looked in records  in regards to advanced directive , POA etc. Pt wants to be DNR CCA . His daughter is medical POA . Questions related to it answered to pt's satisfaction .  Patient is doing well.   Continue OT/PT Rehab.   Current medications are effective. advised to continue current medications.  Will continue to follow   Patient felt satisfied with plan

## 2025-05-15 NOTE — LETTER
Patient: Jesus Lantigua  : 1940    Encounter Date: 05/15/2025    Nursing Home  History & Physical Visit    Name: Jesus Lantigua  MRN: [id number]  YOB: 1940  Date of Service: 5/15/2025      History of Present Illness  Chart reviewed. Hx mainly from chart as pt has dementia. He was admitted to [facility] with increase confusion , some difficulty with speech . He has hx of falls in past too. He has hx of parkinson and dementia. CT brain was neg. He was stabilized at [facility] and now here for rehab.     Hx of htn , DM and past surgical hx of surgery on ankle .  He lives in assisted living place in [place] as per him for a yr or so.     Review of Systems  REVIEW OF SYSTEMS:   All other systems have been reviewed and are negative in relation to patient's complaint and other than what is mentioned in History of present illness.     Vital Signs  Temperature  97.4 °F  05/15/2025 02:06  Pulse  81 per minute  05/15/2025 02:06  Respirations  17 per minute  05/15/2025 09:54  Blood Pressure  108 / 55 mmHg  05/15/2025 09:54  O2 Saturation  94 %  05/15/2025 02:06  Blood Sugar  234 mg/dL  05/15/2025 09:47  Weight  174.8 lbs / Admission BMI: 25.08  2025 22:23  Physical Exam  Vitals noted , stable  Not in acute distress  Conj Pink, No icterus  Neck: No Cervical LN enlargement, No Thyroid enlargement   Lungs: good air entry bilaterally, occ L basilar rales but no  rhonchi  CVS: S1 S2 + , no S3. No loud heart murmur heard.   Abdomen: Soft, non tender , BS +. no organomegaly , no CVA tenderness  CNS: Pt is alert, pleasantly demented, doesnot know what year it is. moving all 4 extremities. no motor weakness or abnormal movements noted on gross examination.  No spine tenderness  Extremities: No edema, No calf tenderness, Chivo's sign negative.     Assessment/Plan:    1. Confusion no new stroke noted   2. Parkinson's disease, unspecified whether dyskinesia present, unspecified whether manifestations fluctuate hx of. On med.     3. Moderate dementia due to Parkinson's disease, without behavioral disturbance, psychotic disturbance, mood disturbance, or anxiety (Multi) hx of.    4. Essential (primary) hypertension - controlled   5. Type 2 diabetes mellitus without complication, unspecified whether long term insulin use    6. Physical deconditioning -will get therapy    7. Hx of fall    8. Chronic kidney disease, stage 1    9. Cognitive communication deficit -speech seems to be ok currently         Plan:     Available medical records reviewed . Patient was examined and detailed History and physical done . All questions answered to patient's satisfaction . Total time for chart reviewing , detailed examination and coordinating care with patient was > 35 minutes.   During visit today , I asked patient as well as looked in records  in regards to advanced directive , POA etc. Pt wants to be DNR CCA . His daughter is medical POA . Questions related to it answered to pt's satisfaction .  Patient is doing well.   Continue OT/PT Rehab.   Current medications are effective. advised to continue current medications.  Will continue to follow   Patient felt satisfied with plan      Electronically Signed By: Earle Lagunas MD   5/15/25 11:02 AM

## 2025-05-19 ENCOUNTER — NURSING HOME VISIT (OUTPATIENT)
Dept: POST ACUTE CARE | Facility: EXTERNAL LOCATION | Age: 85
End: 2025-05-19
Payer: COMMERCIAL

## 2025-05-19 DIAGNOSIS — G20.A1 MODERATE DEMENTIA DUE TO PARKINSON'S DISEASE, WITHOUT BEHAVIORAL DISTURBANCE, PSYCHOTIC DISTURBANCE, MOOD DISTURBANCE, OR ANXIETY (MULTI): ICD-10-CM

## 2025-05-19 DIAGNOSIS — Z79.4 TYPE 2 DIABETES MELLITUS WITHOUT COMPLICATION, WITH LONG-TERM CURRENT USE OF INSULIN: ICD-10-CM

## 2025-05-19 DIAGNOSIS — I10 ESSENTIAL (PRIMARY) HYPERTENSION: ICD-10-CM

## 2025-05-19 DIAGNOSIS — G40.909 SEIZURE DISORDER (MULTI): ICD-10-CM

## 2025-05-19 DIAGNOSIS — E11.9 TYPE 2 DIABETES MELLITUS WITHOUT COMPLICATION, WITH LONG-TERM CURRENT USE OF INSULIN: ICD-10-CM

## 2025-05-19 DIAGNOSIS — G20.A1 PARKINSON'S DISEASE, UNSPECIFIED WHETHER DYSKINESIA PRESENT, UNSPECIFIED WHETHER MANIFESTATIONS FLUCTUATE: Primary | ICD-10-CM

## 2025-05-19 DIAGNOSIS — F02.B0 MODERATE DEMENTIA DUE TO PARKINSON'S DISEASE, WITHOUT BEHAVIORAL DISTURBANCE, PSYCHOTIC DISTURBANCE, MOOD DISTURBANCE, OR ANXIETY (MULTI): ICD-10-CM

## 2025-05-19 PROCEDURE — 99309 SBSQ NF CARE MODERATE MDM 30: CPT | Performed by: INTERNAL MEDICINE

## 2025-05-19 NOTE — PROGRESS NOTES
Nursing home follow up visit  Name: Jesus Lantigua  MRN: [id number]  YOB: 1940  Date of Service: 5/19/2025      Pt was evaluated during nursing home visit today  Patient is doing OK. Participating in therapy well  No sob, cp, PND, orthopnea  Eating ok, sleeping ok   Moving BM ok.   Tolerating medications well  Pleasantly confused    Objective :  Vitals were noted, stable  Patient is not any acute distress  Conjunctiva- Pink, no icterus   No cervical LN enlargement   Lungs clear, s1s2 +   Alert, not oriented  No edema , No calf tenderness     Assessment:    1. Parkinson's disease, unspecified whether dyskinesia present, unspecified whether manifestations fluctuate hx of. On meds.    2. Moderate dementia due to Parkinson's disease, without behavioral disturbance, psychotic disturbance, mood disturbance, or anxiety (Multi)    3. Essential (primary) hypertension -controlled     4. Seizure disorder (Multi) on meds. Sees neurology   5 uncontrolled DM - extra insulin today .      Plan:  Patient is doing well.   Continue OT/PT Rehab.   I have reviewed all active medications patient is currently on . Questions related to medication answered to patient's satisfaction.  Current medications are effective. advised to continue current medications.  Will continue to follow   Patient felt satisfied with plan    Addm: pt has hx of DM and sugar is > 400 . Will give extra insulin . Discussed with nurse about it. Will monitor sugar .

## 2025-05-19 NOTE — LETTER
Patient: Jesus Lantigua  : 1940    Encounter Date: 2025        Nursing home follow up visit  Name: Jesus Lantigua  MRN: [id number]  YOB: 1940  Date of Service: 2025      Pt was evaluated during nursing home visit today  Patient is doing OK. Participating in therapy well  No sob, cp, PND, orthopnea  Eating ok, sleeping ok   Moving BM ok.   Tolerating medications well  Pleasantly confused    Objective :  Vitals were noted, stable  Patient is not any acute distress  Conjunctiva- Pink, no icterus   No cervical LN enlargement   Lungs clear, s1s2 +   Alert, not oriented  No edema , No calf tenderness     Assessment:    1. Parkinson's disease, unspecified whether dyskinesia present, unspecified whether manifestations fluctuate hx of. On meds.    2. Moderate dementia due to Parkinson's disease, without behavioral disturbance, psychotic disturbance, mood disturbance, or anxiety (Multi)    3. Essential (primary) hypertension -controlled     4. Seizure disorder (Multi) on meds. Sees neurology        Plan:  Patient is doing well.   Continue OT/PT Rehab.   I have reviewed all active medications patient is currently on . Questions related to medication answered to patient's satisfaction.  Current medications are effective. advised to continue current medications.  Will continue to follow   Patient felt satisfied with plan            Electronically Signed By: Earle Lagunas MD   25 11:24 AM

## 2025-06-05 ENCOUNTER — NURSING HOME VISIT (OUTPATIENT)
Dept: POST ACUTE CARE | Facility: EXTERNAL LOCATION | Age: 85
End: 2025-06-05
Payer: COMMERCIAL

## 2025-06-05 DIAGNOSIS — R53.81 PHYSICAL DECONDITIONING: ICD-10-CM

## 2025-06-05 DIAGNOSIS — A49.8 CLOSTRIDIOIDES DIFFICILE INFECTION: ICD-10-CM

## 2025-06-05 DIAGNOSIS — G20.A1 PARKINSON'S DISEASE, UNSPECIFIED WHETHER DYSKINESIA PRESENT, UNSPECIFIED WHETHER MANIFESTATIONS FLUCTUATE: ICD-10-CM

## 2025-06-05 DIAGNOSIS — N18.1 CHRONIC KIDNEY DISEASE, STAGE 1: ICD-10-CM

## 2025-06-05 DIAGNOSIS — G40.909 SEIZURE DISORDER (MULTI): ICD-10-CM

## 2025-06-05 DIAGNOSIS — F02.B0 MODERATE DEMENTIA DUE TO PARKINSON'S DISEASE, WITHOUT BEHAVIORAL DISTURBANCE, PSYCHOTIC DISTURBANCE, MOOD DISTURBANCE, OR ANXIETY (MULTI): ICD-10-CM

## 2025-06-05 DIAGNOSIS — R41.0 DELIRIUM: Primary | ICD-10-CM

## 2025-06-05 DIAGNOSIS — E11.21 DIABETES MELLITUS WITH NEPHROPATHY (MULTI): ICD-10-CM

## 2025-06-05 DIAGNOSIS — G20.A1 MODERATE DEMENTIA DUE TO PARKINSON'S DISEASE, WITHOUT BEHAVIORAL DISTURBANCE, PSYCHOTIC DISTURBANCE, MOOD DISTURBANCE, OR ANXIETY (MULTI): ICD-10-CM

## 2025-06-05 DIAGNOSIS — Z87.440 HX: UTI (URINARY TRACT INFECTION): ICD-10-CM

## 2025-06-05 PROCEDURE — 99305 1ST NF CARE MODERATE MDM 35: CPT | Performed by: INTERNAL MEDICINE

## 2025-06-05 NOTE — PROGRESS NOTES
Nursing Home  History & Physical Visit    Name: Jesus Lantigua  MRN: [id number]  YOB: 1940  Date of Service: 6/5/2025      History of Present Illness  Pt known to me from previous care over here few weeks ago. He was discharged from facility couple of weeks ago and after few days on 5/26/25 he was readmitted to [facility] with increase confusion, yelling for help . Thought to have possible UTI, DM with nephropathy, seizure etc. He does have hx of parkinson , dementia as well as seizure disorder. He also noted to have c diff and is on vanco for it. He also had leucocytosis with WBC count > 20 K at hosp. Pt was evaluated by Psych, neuro as well as ID at hosp. Meds were adjusted and now he is here for further rehab.   Patient denies any shortness of breath, PND, orthopnea, chest pain , palpitation, syncope or edema in legs  Not in pain  patient denies any abdominal pain, tenderness, nausea, vomiting, change in bowel habits or blood in stool.    Available hospital discharge summary , pertinent lab and radiological results were reviewed  . Questions related to it answered to patient's satisfaction. CT brain neg. Cxr neg.     Review of Systems  REVIEW OF SYSTEMS:   All other systems have been reviewed and are negative in relation to patient's complaint and other than what is mentioned in History of present illness.     Vital Signs  Temperature  97.5 °F  06/04/2025 21:58  Pulse  88 per minute  06/04/2025 21:58  Respirations  17 per minute  06/04/2025 21:58  Blood Pressure  154 / 71 mmHg  06/04/2025 21:58  O2 Saturation  97 %  06/04/2025 21:58  Blood Sugar  476 mg/dL  06/05/2025 09:33  Physical Exam  Vitals noted , sugar is high   Not in acute distress  Conj Pink, No icterus  Pleasantly demented.   Neck: No Cervical LN enlargement, No Thyroid enlargement   Lungs: good air entry bilaterally, no rales or rhonchi  CVS: S1 S2 + , no S3. No loud heart murmur heard.   Abdomen: Soft, non tender , BS +. no organomegaly , no  CVA tenderness  CNS: Pt is alert, moving all 4 extremities. No abnormal movements noted on gross examination. He is not oriented of place.   No spine tenderness  Extremities: No edema, No calf tenderness, Chivo's sign negative.     Assessment/Plan:    1. Delirium ? Due to infection . Improving now    2. Moderate dementia due to Parkinson's disease, without behavioral disturbance, psychotic disturbance, mood disturbance, or anxiety (Multi) hx of.    3. Seizure disorder (Multi) on med.    4. Parkinson's disease, unspecified whether dyskinesia present, unspecified whether manifestations fluctuate -on med.    5. Physical deconditioning will get therapy    6. 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 Chronic kidney disease, stage 1    8. Hx: UTI (urinary tract infection)         Plan:     Available medical records reviewed . Patient was examined and detailed History and physical done . All questions answered to patient's satisfaction . Total time for chart reviewing , detailed examination and coordinating care with patient was > 25 minutes.   Will continue to monitor for any neuro status change.   Patient is doing well.   Continue OT/PT Rehab.   Current medications are effective. advised to continue current medications.  Will continue to follow   Patient felt satisfied with plan

## 2025-06-05 NOTE — LETTER
Patient: Jesus Lantigua  : 1940    Encounter Date: 2025    Nursing Home  History & Physical Visit    Name: Jesus Lantigua  MRN: [id number]  YOB: 1940  Date of Service: 2025      History of Present Illness  Pt known to me from previous care over here few weeks ago. He was discharged from facility couple of weeks ago and after few days on 25 he was readmitted to \[facility]\"" with increase confusion, yelling for help . Thought to have possible UTI, DM with nephropathy, seizure etc. He does have hx of parkinson , dementia as well as seizure disorder. He also noted to have c diff and is on vanco for it. He also had leucocytosis with WBC count > 20 K at hosp. Pt was evaluated by Psych, neuro as well as ID at \[facility]\"". Meds were adjusted and now he is here for further rehab.   Patient denies any shortness of breath, PND, orthopnea, chest pain , palpitation, syncope or edema in legs  Not in pain  patient denies any abdominal pain, tenderness, nausea, vomiting, change in bowel habits or blood in stool.    Available hospital discharge summary , pertinent lab and radiological results were reviewed  . Questions related to it answered to patient's satisfaction. CT brain neg. Cxr neg.     Review of Systems  REVIEW OF SYSTEMS:   All other systems have been reviewed and are negative in relation to patient's complaint and other than what is mentioned in History of present illness.     Vital Signs  Temperature  97.5 °F  2025 21:58  Pulse  88 per minute  2025 21:58  Respirations  17 per minute  2025 21:58  Blood Pressure  154 / 71 mmHg  2025 21:58  O2 Saturation  97 %  2025 21:58  Blood Sugar  476 mg/dL  2025 09:33  Physical Exam  Vitals noted , sugar is high   Not in acute distress  Conj Pink, No icterus  Pleasantly demented.   Neck: No Cervical LN enlargement, No Thyroid enlargement   Lungs: good air entry bilaterally, no rales or rhonchi  CVS: S1 S2 + , no S3. No loud  heart murmur heard.   Abdomen: Soft, non tender , BS +. no organomegaly , no CVA tenderness  CNS: Pt is alert, moving all 4 extremities. No abnormal movements noted on gross examination. He is not oriented of place.   No spine tenderness  Extremities: No edema, No calf tenderness, Chivo's sign negative.     Assessment/Plan:    1. Delirium ? Due to infection . Improving now    2. Moderate dementia due to Parkinson's disease, without behavioral disturbance, psychotic disturbance, mood disturbance, or anxiety (Multi) hx of.    3. Seizure disorder (Multi) on med.    4. Parkinson's disease, unspecified whether dyskinesia present, unspecified whether manifestations fluctuate -on med.    5. Physical deconditioning will get therapy    6. 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 Chronic kidney disease, stage 1    8. Hx: UTI (urinary tract infection)         Plan:     Available medical records reviewed . Patient was examined and detailed History and physical done . All questions answered to patient's satisfaction . Total time for chart reviewing , detailed examination and coordinating care with patient was > 25 minutes.   Will continue to monitor for any neuro status change.   Patient is doing well.   Continue OT/PT Rehab.   Current medications are effective. advised to continue current medications.  Will continue to follow   Patient felt satisfied with plan      Electronically Signed By: Earle Lagunas MD   6/5/25 10:12 AM

## 2025-06-09 ENCOUNTER — NURSING HOME VISIT (OUTPATIENT)
Dept: POST ACUTE CARE | Facility: EXTERNAL LOCATION | Age: 85
End: 2025-06-09
Payer: COMMERCIAL

## 2025-06-09 DIAGNOSIS — I10 ESSENTIAL (PRIMARY) HYPERTENSION: ICD-10-CM

## 2025-06-09 DIAGNOSIS — R41.841 COGNITIVE COMMUNICATION DEFICIT: ICD-10-CM

## 2025-06-09 DIAGNOSIS — G20.A1 PARKINSON'S DISEASE, UNSPECIFIED WHETHER DYSKINESIA PRESENT, UNSPECIFIED WHETHER MANIFESTATIONS FLUCTUATE: ICD-10-CM

## 2025-06-09 DIAGNOSIS — R41.0 DELIRIUM: Primary | ICD-10-CM

## 2025-06-09 PROCEDURE — 99308 SBSQ NF CARE LOW MDM 20: CPT | Performed by: INTERNAL MEDICINE

## 2025-06-09 NOTE — LETTER
Patient: Jesus Lantigua  : 1940    Encounter Date: 2025        Nursing home follow up visit  Name: Jesus Lantigua  MRN: [id number]  YOB: 1940  Date of Service: 2025      Pt was evaluated during nursing home visit today  Patient is doing OK. Participating in therapy well  No sob, cp, PND, orthopnea  Eating ok, sleeping ok   Moving BM ok.   Tolerating medications well    Objective :  Vitals were noted  Patient is not any acute distress  Conjunctiva- Pink, no icterus   No cervical LN enlargement   Lungs clear, s1s2 +   No edema , No calf tenderness   Pleasantly somewhat confused. Answers ok to simple questions.     Assessment:    1. Delirium -somewhat improved now .    2. Parkinson's disease, unspecified whether dyskinesia present, unspecified whether manifestations fluctuate hx of.    3. Cognitive communication deficit -hx of. stable   4. Essential (primary) hypertension - bp controlled        Plan:  Patient is doing well.   Continue OT/PT Rehab.   I have reviewed all active medications patient is currently on . Questions related to medication answered to patient's satisfaction.  Current medications are effective. advised to continue current medications.  Will continue to follow   Patient felt satisfied with plan            Electronically Signed By: Earle Lagunas MD   25 12:02 PM

## 2025-06-09 NOTE — PROGRESS NOTES
Nursing home follow up visit  Name: Jesus Lantigua  MRN: [id number]  YOB: 1940  Date of Service: 6/9/2025      Pt was evaluated during nursing home visit today  Patient is doing OK. Participating in therapy well  No sob, cp, PND, orthopnea  Eating ok, sleeping ok   Moving BM ok.   Tolerating medications well    Objective :  Vitals were noted  Patient is not any acute distress  Conjunctiva- Pink, no icterus   No cervical LN enlargement   Lungs clear, s1s2 +   No edema , No calf tenderness   Pleasantly somewhat confused. Answers ok to simple questions.     Assessment:    1. Delirium -somewhat improved now .    2. Parkinson's disease, unspecified whether dyskinesia present, unspecified whether manifestations fluctuate hx of.    3. Cognitive communication deficit -hx of. stable   4. Essential (primary) hypertension - bp controlled        Plan:  Patient is doing well.   Continue OT/PT Rehab.   I have reviewed all active medications patient is currently on . Questions related to medication answered to patient's satisfaction.  Current medications are effective. advised to continue current medications.  Will continue to follow   Patient felt satisfied with plan

## 2025-06-12 ENCOUNTER — NURSING HOME VISIT (OUTPATIENT)
Dept: POST ACUTE CARE | Facility: EXTERNAL LOCATION | Age: 85
End: 2025-06-12
Payer: COMMERCIAL

## 2025-06-12 DIAGNOSIS — G40.909 SEIZURE DISORDER (MULTI): ICD-10-CM

## 2025-06-12 DIAGNOSIS — R53.1 WEAKNESS: ICD-10-CM

## 2025-06-12 DIAGNOSIS — G20.A1 PARKINSON'S DISEASE, UNSPECIFIED WHETHER DYSKINESIA PRESENT, UNSPECIFIED WHETHER MANIFESTATIONS FLUCTUATE: Primary | ICD-10-CM

## 2025-06-12 DIAGNOSIS — R41.841 COGNITIVE COMMUNICATION DEFICIT: ICD-10-CM

## 2025-06-12 PROCEDURE — 99308 SBSQ NF CARE LOW MDM 20: CPT | Performed by: INTERNAL MEDICINE

## 2025-06-12 NOTE — LETTER
Patient: Jesus Lantigua  : 1940    Encounter Date: 2025        Nursing home follow up visit  Name: Jesus Lantigua  MRN: [id number]  YOB: 1940  Date of Service: 2025      Pt was evaluated during nursing home visit today  Patient is doing OK. Participating in therapy well  No sob, cp, PND, orthopnea  Eating ok, sleeping ok   Moving BM ok.   Tolerating medications well    Objective :  Temperature  97.9 °F  2025 03:42  Pulse  81 per minute  2025 03:42  Respirations  16 per minute  2025 03:42  Blood Pressure  147 / 77 mmHg  2025 03:42  O2 Saturation  97 %  2025 03:42  Blood Sugar  193 mg/dL  2025 09:15  Vitals were noted. Bp slightly high. Will monitor it  Patient is not any acute distress  Conjunctiva- Pink, no icterus   No cervical LN enlargement   Lungs clear, s1s2 +   No edema , No calf tenderness   Alert, not completely oriented. , but answers ok to simple questions.     Assessment:    1. Parkinson's disease, unspecified whether dyskinesia present, unspecified whether manifestations fluctuate hx of.    2. Weakness -able to walk in therapy ok    3. Seizure disorder (Multi) hx of.    4. Cognitive communication deficit hx of.         Plan:  Patient is doing well.   Continue OT/PT Rehab.   Family may think about LTC over at this facility if no significant improvement to go back to his assisted living place.   I have reviewed all active medications patient is currently on . Questions related to medication answered to patient's satisfaction.  Current medications are effective. advised to continue current medications.  Will continue to follow   Patient felt satisfied with plan            Electronically Signed By: Earle Lagunas MD   25 12:18 PM

## 2025-06-12 NOTE — PROGRESS NOTES
Nursing home follow up visit  Name: Jesus Lantigua  MRN: [id number]  YOB: 1940  Date of Service: 6/12/2025      Pt was evaluated during nursing home visit today  Patient is doing OK. Participating in therapy well  No sob, cp, PND, orthopnea  Eating ok, sleeping ok   Moving BM ok.   Tolerating medications well    Objective :  Temperature  97.9 °F  06/12/2025 03:42  Pulse  81 per minute  06/12/2025 03:42  Respirations  16 per minute  06/12/2025 03:42  Blood Pressure  147 / 77 mmHg  06/12/2025 03:42  O2 Saturation  97 %  06/12/2025 03:42  Blood Sugar  193 mg/dL  06/12/2025 09:15  Vitals were noted. Bp slightly high. Will monitor it  Patient is not any acute distress  Conjunctiva- Pink, no icterus   No cervical LN enlargement   Lungs clear, s1s2 +   No edema , No calf tenderness   Alert, not completely oriented. , but answers ok to simple questions.     Assessment:    1. Parkinson's disease, unspecified whether dyskinesia present, unspecified whether manifestations fluctuate hx of.    2. Weakness -able to walk in therapy ok    3. Seizure disorder (Multi) hx of.    4. Cognitive communication deficit hx of.         Plan:  Patient is doing well.   Continue OT/PT Rehab.   Family may think about LTC over at this facility if no significant improvement to go back to his assisted living place.   I have reviewed all active medications patient is currently on . Questions related to medication answered to patient's satisfaction.  Current medications are effective. advised to continue current medications.  Will continue to follow   Patient felt satisfied with plan